# Patient Record
Sex: FEMALE | Race: WHITE | NOT HISPANIC OR LATINO | Employment: FULL TIME | URBAN - METROPOLITAN AREA
[De-identification: names, ages, dates, MRNs, and addresses within clinical notes are randomized per-mention and may not be internally consistent; named-entity substitution may affect disease eponyms.]

---

## 2023-04-18 PROBLEM — K21.9 GERD (GASTROESOPHAGEAL REFLUX DISEASE): Status: ACTIVE | Noted: 2023-04-18

## 2023-04-18 PROBLEM — F98.8 ADD (ATTENTION DEFICIT DISORDER): Status: ACTIVE | Noted: 2023-04-18

## 2023-04-18 PROBLEM — N91.2 AMENORRHEA: Status: ACTIVE | Noted: 2023-04-18

## 2023-04-18 PROBLEM — F33.9 RECURRENT MAJOR DEPRESSIVE DISORDER (CMS-HCC): Status: ACTIVE | Noted: 2023-04-18

## 2023-04-18 PROBLEM — N92.6 IRREGULAR MENSTRUAL CYCLE: Status: ACTIVE | Noted: 2023-04-18

## 2023-04-18 PROBLEM — N92.0 MENORRHAGIA: Status: ACTIVE | Noted: 2023-04-18

## 2023-04-18 PROBLEM — E28.2 PCOS (POLYCYSTIC OVARIAN SYNDROME): Status: ACTIVE | Noted: 2023-04-18

## 2023-04-18 PROBLEM — N92.6 MISSED MENSES: Status: ACTIVE | Noted: 2023-04-18

## 2023-04-18 PROBLEM — F41.9 ANXIETY: Status: ACTIVE | Noted: 2023-04-18

## 2023-04-18 PROBLEM — E66.01 OBESITY, MORBID, BMI 40.0-49.9 (MULTI): Status: ACTIVE | Noted: 2023-04-18

## 2023-04-18 PROBLEM — T75.3XXA MOTION SICKNESS: Status: ACTIVE | Noted: 2023-04-18

## 2023-04-28 ENCOUNTER — PATIENT MESSAGE (OUTPATIENT)
Dept: PRIMARY CARE | Facility: CLINIC | Age: 23
End: 2023-04-28
Payer: COMMERCIAL

## 2023-05-01 ENCOUNTER — OFFICE VISIT (OUTPATIENT)
Dept: PRIMARY CARE | Facility: CLINIC | Age: 23
End: 2023-05-01
Payer: COMMERCIAL

## 2023-05-01 VITALS
BODY MASS INDEX: 51.19 KG/M2 | WEIGHT: 289 LBS | SYSTOLIC BLOOD PRESSURE: 128 MMHG | OXYGEN SATURATION: 98 % | TEMPERATURE: 97.3 F | HEART RATE: 85 BPM | DIASTOLIC BLOOD PRESSURE: 80 MMHG

## 2023-05-01 DIAGNOSIS — R00.2 PALPITATION: Primary | ICD-10-CM

## 2023-05-01 DIAGNOSIS — N91.2 AMENORRHEA: ICD-10-CM

## 2023-05-01 LAB
POC APPEARANCE, URINE: CLEAR
POC BILIRUBIN, URINE: NEGATIVE
POC BLOOD, URINE: NEGATIVE
POC COLOR, URINE: YELLOW
POC GLUCOSE, URINE: NEGATIVE MG/DL
POC KETONES, URINE: NEGATIVE MG/DL
POC LEUKOCYTES, URINE: ABNORMAL
POC NITRITE,URINE: NEGATIVE
POC PH, URINE: 6.5 PH
POC PROTEIN, URINE: NEGATIVE MG/DL
POC SPECIFIC GRAVITY, URINE: 1.02
POC UROBILINOGEN, URINE: 0.2 EU/DL
PREGNANCY TEST URINE, POC: NEGATIVE

## 2023-05-01 PROCEDURE — 93000 ELECTROCARDIOGRAM COMPLETE: CPT | Performed by: FAMILY MEDICINE

## 2023-05-01 PROCEDURE — 81025 URINE PREGNANCY TEST: CPT | Performed by: FAMILY MEDICINE

## 2023-05-01 PROCEDURE — 99214 OFFICE O/P EST MOD 30 MIN: CPT | Performed by: FAMILY MEDICINE

## 2023-05-01 PROCEDURE — 81003 URINALYSIS AUTO W/O SCOPE: CPT | Performed by: FAMILY MEDICINE

## 2023-05-01 RX ORDER — FLUTICASONE PROPIONATE 50 MCG
2 SPRAY, SUSPENSION (ML) NASAL DAILY
COMMUNITY
Start: 2023-04-03

## 2023-05-01 RX ORDER — DEXTROAMPHETAMINE SACCHARATE, AMPHETAMINE ASPARTATE, DEXTROAMPHETAMINE SULFATE AND AMPHETAMINE SULFATE 2.5; 2.5; 2.5; 2.5 MG/1; MG/1; MG/1; MG/1
1 TABLET ORAL DAILY
COMMUNITY
Start: 2019-05-29 | End: 2023-05-01 | Stop reason: ALTCHOICE

## 2023-05-01 RX ORDER — CETIRIZINE HYDROCHLORIDE 10 MG/1
10 TABLET ORAL DAILY
COMMUNITY
Start: 2023-04-03

## 2023-05-01 RX ORDER — OMEPRAZOLE 20 MG/1
20 CAPSULE, DELAYED RELEASE ORAL
COMMUNITY
Start: 2023-04-03 | End: 2024-02-16

## 2023-05-01 ASSESSMENT — PATIENT HEALTH QUESTIONNAIRE - PHQ9
7. TROUBLE CONCENTRATING ON THINGS, SUCH AS READING THE NEWSPAPER OR WATCHING TELEVISION: NOT AT ALL
9. THOUGHTS THAT YOU WOULD BE BETTER OFF DEAD, OR OF HURTING YOURSELF: NOT AT ALL
10. IF YOU CHECKED OFF ANY PROBLEMS, HOW DIFFICULT HAVE THESE PROBLEMS MADE IT FOR YOU TO DO YOUR WORK, TAKE CARE OF THINGS AT HOME, OR GET ALONG WITH OTHER PEOPLE: NOT DIFFICULT AT ALL
4. FEELING TIRED OR HAVING LITTLE ENERGY: SEVERAL DAYS
8. MOVING OR SPEAKING SO SLOWLY THAT OTHER PEOPLE COULD HAVE NOTICED. OR THE OPPOSITE, BEING SO FIGETY OR RESTLESS THAT YOU HAVE BEEN MOVING AROUND A LOT MORE THAN USUAL: NOT AT ALL
1. LITTLE INTEREST OR PLEASURE IN DOING THINGS: NOT AT ALL
2. FEELING DOWN, DEPRESSED OR HOPELESS: SEVERAL DAYS
SUM OF ALL RESPONSES TO PHQ9 QUESTIONS 1 AND 2: 1
6. FEELING BAD ABOUT YOURSELF - OR THAT YOU ARE A FAILURE OR HAVE LET YOURSELF OR YOUR FAMILY DOWN: NOT AT ALL
3. TROUBLE FALLING OR STAYING ASLEEP OR SLEEPING TOO MUCH: MORE THAN HALF THE DAYS
5. POOR APPETITE OR OVEREATING: SEVERAL DAYS
SUM OF ALL RESPONSES TO PHQ QUESTIONS 1-9: 5

## 2023-05-01 ASSESSMENT — ANXIETY QUESTIONNAIRES
2. NOT BEING ABLE TO STOP OR CONTROL WORRYING: NOT AT ALL
4. TROUBLE RELAXING: NOT AT ALL
GAD7 TOTAL SCORE: 4
6. BECOMING EASILY ANNOYED OR IRRITABLE: MORE THAN HALF THE DAYS
7. FEELING AFRAID AS IF SOMETHING AWFUL MIGHT HAPPEN: SEVERAL DAYS
5. BEING SO RESTLESS THAT IT IS HARD TO SIT STILL: NOT AT ALL
1. FEELING NERVOUS, ANXIOUS, OR ON EDGE: SEVERAL DAYS
IF YOU CHECKED OFF ANY PROBLEMS ON THIS QUESTIONNAIRE, HOW DIFFICULT HAVE THESE PROBLEMS MADE IT FOR YOU TO DO YOUR WORK, TAKE CARE OF THINGS AT HOME, OR GET ALONG WITH OTHER PEOPLE: SOMEWHAT DIFFICULT
3. WORRYING TOO MUCH ABOUT DIFFERENT THINGS: NOT AT ALL

## 2023-05-01 NOTE — PROGRESS NOTES
Subjective   Patient ID: Samantha Sunshine is a 23 y.o. female who presents for Med Refill and Heart Flutters (X1 week, consistent, SOB, chest discomfort).  HPI    PALPITATIONS: started in high school   Off and on over the last few years    Started last Wednesday night and feeling like several time per hour skips a beat  Episodes last 5-15 minutes  Some pressure in  her chest and some dizziness  No SOB    Switched to keto type diet - strict protein and veggies 3 weeks  Not taking regular potassium and Elderberry     Menstrual cycles regular, not overlyheavy  Better on BCPs     Patient Health Questionnaire-9 Score: 5   RL-7 Total Score: 4      Review of Systems  Review of Systems negative except as noted in HPI and Chief complaint.     Objective     Patient Health Questionnaire-9 Score: 5     RL-7 Total Score: 4     Physical Exam  Constitutional:       General: She is not in acute distress.     Appearance: Normal appearance. She is not ill-appearing.   HENT:      Head: Normocephalic and atraumatic.   Neck:      Vascular: No carotid bruit.   Cardiovascular:      Rate and Rhythm: Normal rate.      Pulses: Normal pulses.      Heart sounds: Normal heart sounds. No murmur heard.     No gallop.      Comments: Skipped beat   Pulmonary:      Effort: Pulmonary effort is normal.      Breath sounds: Normal breath sounds. No wheezing, rhonchi or rales.   Musculoskeletal:      Cervical back: Normal range of motion and neck supple. No rigidity or tenderness.   Lymphadenopathy:      Cervical: No cervical adenopathy.   Skin:     General: Skin is warm and dry.   Neurological:      Mental Status: She is alert.   Psychiatric:         Mood and Affect: Mood normal.         Behavior: Behavior normal.       /80 (BP Location: Left arm, Patient Position: Sitting, BP Cuff Size: Adult)   Pulse 85   Temp 36.3 °C (97.3 °F)   Wt 131 kg (289 lb)   SpO2 98%   BMI 51.19 kg/m²      Assessment/Plan   Problem List Items Addressed This Visit           Genitourinary    Amenorrhea    Relevant Orders    POCT pregnancy, urine manually resulted (Completed)     Other Visit Diagnoses       Palpitation    -  Primary    Relevant Orders    Magnesium    POCT UA Automated manually resulted (Completed)    ECG 12 lead (Clinic Performed) (Completed)    Holter or Event Cardiac Monitor    CBC    Comprehensive Metabolic Panel    TSH with reflex to Free T4 if abnormal

## 2023-05-10 DIAGNOSIS — E28.2 POLYCYSTIC OVARIAN SYNDROME: ICD-10-CM

## 2023-05-12 DIAGNOSIS — E28.2 POLYCYSTIC OVARIAN SYNDROME: ICD-10-CM

## 2023-05-12 RX ORDER — DROSPIRENONE AND ETHINYL ESTRADIOL 0.02-3(28)
KIT ORAL
Qty: 28 TABLET | Refills: 2 | OUTPATIENT
Start: 2023-05-12

## 2023-05-12 RX ORDER — METFORMIN HYDROCHLORIDE 500 MG/1
TABLET ORAL
Qty: 60 TABLET | Refills: 0 | OUTPATIENT
Start: 2023-05-12 | End: 2023-06-11

## 2023-06-07 ENCOUNTER — APPOINTMENT (OUTPATIENT)
Dept: PRIMARY CARE | Facility: CLINIC | Age: 23
End: 2023-06-07
Payer: COMMERCIAL

## 2023-06-29 ENCOUNTER — OFFICE VISIT (OUTPATIENT)
Dept: PRIMARY CARE | Facility: CLINIC | Age: 23
End: 2023-06-29
Payer: COMMERCIAL

## 2023-06-29 VITALS
OXYGEN SATURATION: 97 % | HEIGHT: 63 IN | BODY MASS INDEX: 51.38 KG/M2 | HEART RATE: 89 BPM | DIASTOLIC BLOOD PRESSURE: 80 MMHG | SYSTOLIC BLOOD PRESSURE: 122 MMHG | WEIGHT: 290 LBS

## 2023-06-29 DIAGNOSIS — E66.01 CLASS 3 SEVERE OBESITY DUE TO EXCESS CALORIES WITHOUT SERIOUS COMORBIDITY WITH BODY MASS INDEX (BMI) OF 50.0 TO 59.9 IN ADULT (MULTI): ICD-10-CM

## 2023-06-29 DIAGNOSIS — I49.3 FREQUENT PVCS: ICD-10-CM

## 2023-06-29 DIAGNOSIS — F41.8 MIXED ANXIETY AND DEPRESSIVE DISORDER: ICD-10-CM

## 2023-06-29 DIAGNOSIS — E28.2 POLYCYSTIC OVARIAN SYNDROME: ICD-10-CM

## 2023-06-29 DIAGNOSIS — Z00.00 ANNUAL PHYSICAL EXAM: Primary | ICD-10-CM

## 2023-06-29 DIAGNOSIS — K21.9 GASTROESOPHAGEAL REFLUX DISEASE WITHOUT ESOPHAGITIS: ICD-10-CM

## 2023-06-29 DIAGNOSIS — F41.9 ANXIETY: ICD-10-CM

## 2023-06-29 DIAGNOSIS — Z01.419 ENCOUNTER FOR GYNECOLOGICAL EXAMINATION WITHOUT ABNORMAL FINDING: ICD-10-CM

## 2023-06-29 PROBLEM — E66.813 CLASS 3 SEVERE OBESITY DUE TO EXCESS CALORIES WITHOUT SERIOUS COMORBIDITY WITH BODY MASS INDEX (BMI) OF 50.0 TO 59.9 IN ADULT: Status: ACTIVE | Noted: 2023-04-18

## 2023-06-29 PROCEDURE — 1036F TOBACCO NON-USER: CPT | Performed by: FAMILY MEDICINE

## 2023-06-29 PROCEDURE — 99395 PREV VISIT EST AGE 18-39: CPT | Performed by: FAMILY MEDICINE

## 2023-06-29 PROCEDURE — 99214 OFFICE O/P EST MOD 30 MIN: CPT | Performed by: FAMILY MEDICINE

## 2023-06-29 PROCEDURE — 3008F BODY MASS INDEX DOCD: CPT | Performed by: FAMILY MEDICINE

## 2023-06-29 RX ORDER — METFORMIN HYDROCHLORIDE 500 MG/1
500 TABLET ORAL 2 TIMES DAILY
Qty: 180 TABLET | Refills: 1 | Status: SHIPPED | OUTPATIENT
Start: 2023-06-29 | End: 2023-10-19 | Stop reason: SDUPTHER

## 2023-06-29 RX ORDER — METOPROLOL SUCCINATE 25 MG/1
25 TABLET, EXTENDED RELEASE ORAL DAILY
Qty: 30 TABLET | Refills: 1 | Status: SHIPPED | OUTPATIENT
Start: 2023-06-29 | End: 2023-07-16

## 2023-06-29 RX ORDER — ESCITALOPRAM OXALATE 20 MG/1
40 TABLET ORAL DAILY
Qty: 60 TABLET | Refills: 2 | Status: SHIPPED | OUTPATIENT
Start: 2023-06-29 | End: 2023-06-29 | Stop reason: SDUPTHER

## 2023-06-29 RX ORDER — ESCITALOPRAM OXALATE 20 MG/1
40 TABLET ORAL DAILY
Qty: 180 TABLET | Refills: 1 | Status: SHIPPED | OUTPATIENT
Start: 2023-06-29 | End: 2023-10-19 | Stop reason: SDUPTHER

## 2023-06-29 ASSESSMENT — PATIENT HEALTH QUESTIONNAIRE - PHQ9
9. THOUGHTS THAT YOU WOULD BE BETTER OFF DEAD, OR OF HURTING YOURSELF: NOT AT ALL
4. FEELING TIRED OR HAVING LITTLE ENERGY: MORE THAN HALF THE DAYS
7. TROUBLE CONCENTRATING ON THINGS, SUCH AS READING THE NEWSPAPER OR WATCHING TELEVISION: NEARLY EVERY DAY
5. POOR APPETITE OR OVEREATING: MORE THAN HALF THE DAYS
SUM OF ALL RESPONSES TO PHQ9 QUESTIONS 1 AND 2: 2
1. LITTLE INTEREST OR PLEASURE IN DOING THINGS: SEVERAL DAYS
6. FEELING BAD ABOUT YOURSELF - OR THAT YOU ARE A FAILURE OR HAVE LET YOURSELF OR YOUR FAMILY DOWN: SEVERAL DAYS
8. MOVING OR SPEAKING SO SLOWLY THAT OTHER PEOPLE COULD HAVE NOTICED. OR THE OPPOSITE, BEING SO FIGETY OR RESTLESS THAT YOU HAVE BEEN MOVING AROUND A LOT MORE THAN USUAL: NOT AT ALL
2. FEELING DOWN, DEPRESSED OR HOPELESS: SEVERAL DAYS
10. IF YOU CHECKED OFF ANY PROBLEMS, HOW DIFFICULT HAVE THESE PROBLEMS MADE IT FOR YOU TO DO YOUR WORK, TAKE CARE OF THINGS AT HOME, OR GET ALONG WITH OTHER PEOPLE: SOMEWHAT DIFFICULT
SUM OF ALL RESPONSES TO PHQ QUESTIONS 1-9: 12
3. TROUBLE FALLING OR STAYING ASLEEP OR SLEEPING TOO MUCH: MORE THAN HALF THE DAYS

## 2023-06-29 ASSESSMENT — PROMIS GLOBAL HEALTH SCALE
EMOTIONAL_PROBLEMS: OFTEN
RATE_MENTAL_HEALTH: FAIR
CARRYOUT_PHYSICAL_ACTIVITIES: COMPLETELY
RATE_SOCIAL_SATISFACTION: VERY GOOD
RATE_PHYSICAL_HEALTH: GOOD
RATE_GENERAL_HEALTH: GOOD
RATE_AVERAGE_FATIGUE: MODERATE
RATE_QUALITY_OF_LIFE: GOOD
RATE_AVERAGE_PAIN: 0
CARRYOUT_SOCIAL_ACTIVITIES: VERY GOOD

## 2023-06-29 ASSESSMENT — ANXIETY QUESTIONNAIRES
5. BEING SO RESTLESS THAT IT IS HARD TO SIT STILL: NOT AT ALL
GAD7 TOTAL SCORE: 8
7. FEELING AFRAID AS IF SOMETHING AWFUL MIGHT HAPPEN: SEVERAL DAYS
2. NOT BEING ABLE TO STOP OR CONTROL WORRYING: SEVERAL DAYS
3. WORRYING TOO MUCH ABOUT DIFFERENT THINGS: SEVERAL DAYS
1. FEELING NERVOUS, ANXIOUS, OR ON EDGE: MORE THAN HALF THE DAYS
IF YOU CHECKED OFF ANY PROBLEMS ON THIS QUESTIONNAIRE, HOW DIFFICULT HAVE THESE PROBLEMS MADE IT FOR YOU TO DO YOUR WORK, TAKE CARE OF THINGS AT HOME, OR GET ALONG WITH OTHER PEOPLE: SOMEWHAT DIFFICULT
4. TROUBLE RELAXING: SEVERAL DAYS
6. BECOMING EASILY ANNOYED OR IRRITABLE: MORE THAN HALF THE DAYS

## 2023-06-29 NOTE — PROGRESS NOTES
"Subjective   Samantha Sunshine is a 23 y.o. female and is here for a comprehensive physical exam. The patient reports  the following concerns .  TACHYCARDIA: Had another episode that landed her in ED for racing heart  D dimer  elevated - CT normal    Review of recent Holter with multiple episodes of sVT  She has not made follow up appointment with Dr. Anaya, cardiology    Do you take any herbs or supplements that were not prescribed by a doctor? yes magnesium  Are you taking calcium supplements? no  Are you taking aspirin daily? 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  Tobacco Use: Low Risk  (2023)    Patient History     Smoking Tobacco Use: Never     Smokeless Tobacco Use: Never     Passive Exposure: Not on file     Alcohol Use: Not on file       DIET: regular, well rounded  1 caffeine drink per day    EXERCISE: occasional walking 1-2 times per week    ELIMINATION: no constipation or diarrhea  She does have frequent bowel movements 3-4 times per day  Has not tried metameucil for fiber supplementation    No urinary issues    SLEEP: no issues    MOODS: increased irritability easily annoyed  Waking up with anxious feeling   Tried Zoloft in the past - made her emotionless  More depressed   Not seeing counselor  PHQ-9: Patient Health Questionnaire-9 Score: 12    RL-7: RL-7 Total Score: 8     OB/GYN:   No known family history of breast cancer.  Menstrual cycles - still mildly irregular, stopped BCPs early May - had one cycle since      History:  LMP: No LMP recorded.  Last pap date: not performed yet  : 0  Para: 0    Review of Systems   Review of Systems negative except as noted in HPI and Chief complaint.     Objective     /80 (BP Location: Left arm, Patient Position: Sitting, BP Cuff Size: Adult)   Pulse 89   Ht 1.6 m (5' 3\")   Wt 132 kg (290 lb)   SpO2 97%   BMI 51.37 kg/m²      Physical Exam  Constitutional:       General: She is not in acute distress.     " Appearance: Normal appearance.   HENT:      Head: Normocephalic and atraumatic.      Right Ear: Tympanic membrane, ear canal and external ear normal.      Left Ear: Tympanic membrane, ear canal and external ear normal.      Nose: Nose normal.      Mouth/Throat:      Mouth: Mucous membranes are moist.      Pharynx: Oropharynx is clear.   Eyes:      Extraocular Movements: Extraocular movements intact.      Conjunctiva/sclera: Conjunctivae normal.      Pupils: Pupils are equal, round, and reactive to light.   Neck:      Vascular: No carotid bruit.   Cardiovascular:      Rate and Rhythm: Normal rate and regular rhythm.      Pulses: Normal pulses.      Heart sounds: Normal heart sounds. No murmur heard.  Pulmonary:      Effort: Pulmonary effort is normal.      Breath sounds: Normal breath sounds. No wheezing, rhonchi or rales.   Abdominal:      General: Abdomen is flat. Bowel sounds are normal. There is no distension.      Palpations: Abdomen is soft.      Tenderness: There is no abdominal tenderness. There is no guarding or rebound.   Musculoskeletal:         General: No swelling. Normal range of motion.      Cervical back: No rigidity or tenderness.   Lymphadenopathy:      Cervical: No cervical adenopathy.   Skin:     General: Skin is warm and dry.      Findings: No rash.   Neurological:      General: No focal deficit present.      Mental Status: She is alert and oriented to person, place, and time.      Cranial Nerves: No cranial nerve deficit.      Coordination: Coordination normal.      Gait: Gait normal.   Psychiatric:         Mood and Affect: Mood normal.         Behavior: Behavior normal.       Assessment/Plan   Healthy female exam.      1. Please have labs drawn at your earliest convenience.  You should fast 12 hours prior to arriving at the lab - during these 12 hours you may have water, black coffee, black tea - nothing with cream or sugar and no solid foods.    Our office will contact you with results after  they have been reviewed.  Please allow 48 - 72 hours for most results, some may take longer.  Please keep in mind that results may post immediately to your online portal, even before we have a chance to review them.  Once we have had the opportunity to review we will post comments and have our staff contact you with results and any instructions.  Please call our office if you do not hear from our office in 7 days.     2. Patient Counseling:  --Nutrition: Stressed importance of moderation in sodium/caffeine intake, saturated fat and cholesterol, caloric balance, sufficient intake of fresh fruits, vegetables, fiber, calcium, iron, and 1 mg of folate supplement per day (for females capable of pregnancy).  --Discussed the issue of estrogen replacement, calcium supplement, and the daily use of baby aspirin.  --Exercise: Stressed the importance of regular exercise.   --Substance Abuse: Discussed cessation/primary prevention of tobacco, alcohol, or other drug use; driving or other dangerous activities under the influence; availability of treatment for abuse.    --Sexuality: Discussed sexually transmitted diseases, partner selection, use of condoms, avoidance of unintended pregnancy  and contraceptive alternatives.   --Injury prevention: Discussed safety belts, safety helmets, smoke detector, smoking near bedding or upholstery.   --Dental health: Discussed importance of regular tooth brushing, flossing, and dental visits.  --Immunizations reviewed.  --Discussed benefits of screening colonoscopy.  --After hours service discussed with patient  3. Discussed the patient's BMI with her.  The BMI is above average. The patient received Provided instructions on dietary changes  Provided instructions on exercise  Advised to Increase physical activity because they have an above normal BMI.    Follow up next physical in 1 year    Problem List Items Addressed This Visit       Anxiety     I have discussed the collaborative care model  for this patient's behavioral health care.  Written detailed information and identifying the members of this care team was provided to patient.  They give permission for the Behavioral Health Manager (BHM) and psychiatric consultant to be included in their care with my continued primary management.  Patient made aware that services provided as part of the Collaborative Care Model are subject to insurance billing.          Relevant Orders    Follow Up In Advanced Primary Care - Behavioral Health [facility]    GERD (gastroesophageal reflux disease)    Class 3 severe obesity due to excess calories without serious comorbidity with body mass index (BMI) of 50.0 to 59.9 in adult (CMS/[facility])    Annual physical exam - Primary    Mixed anxiety and depressive disorder    Relevant Medications    escitalopram (Lexapro) 20 mg tablet    Other Relevant Orders    Follow Up In Advanced Primary Care - Behavioral Health [facility]    Frequent PVCs    Relevant Medications    metoprolol succinate XL (Toprol-XL) 25 mg 24 hr tablet    Other Relevant Orders    Home sleep apnea test (HSAT)     Other Visit Diagnoses       Encounter for gynecological examination without abnormal finding        Relevant Orders    Referral to Obstetrics / Gynecology    Polycystic ovarian syndrome        Relevant Medications    metFORMIN (Glucophage) 500 mg tablet        The patient received Provided instructions on dietary changes  Provided instructions on exercise  Advised to Increase physical activity because they have an above normal BMI.   Follow up with cardiology recommended.    Follow up 4-6 weeks for recheck.

## 2023-06-30 NOTE — ASSESSMENT & PLAN NOTE
I have discussed the collaborative care model for this patient's behavioral health care.  Written detailed information and identifying the members of this care team was provided to patient.  They give permission for the Behavioral Health Manager (BHM) and psychiatric consultant to be included in their care with my continued primary management.  Patient made aware that services provided as part of the Collaborative Care Model are subject to insurance billing.

## 2023-07-11 ENCOUNTER — SOCIAL WORK (OUTPATIENT)
Dept: PRIMARY CARE | Facility: CLINIC | Age: 23
End: 2023-07-11
Payer: COMMERCIAL

## 2023-07-11 DIAGNOSIS — F41.8 MIXED ANXIETY AND DEPRESSIVE DISORDER: ICD-10-CM

## 2023-07-11 DIAGNOSIS — F41.9 ANXIETY: ICD-10-CM

## 2023-07-11 ASSESSMENT — ANXIETY QUESTIONNAIRES
6. BECOMING EASILY ANNOYED OR IRRITABLE: SEVERAL DAYS
5. BEING SO RESTLESS THAT IT IS HARD TO SIT STILL: NOT AT ALL
GAD7 TOTAL SCORE: 5
3. WORRYING TOO MUCH ABOUT DIFFERENT THINGS: MORE THAN HALF THE DAYS
IF YOU CHECKED OFF ANY PROBLEMS ON THIS QUESTIONNAIRE, HOW DIFFICULT HAVE THESE PROBLEMS MADE IT FOR YOU TO DO YOUR WORK, TAKE CARE OF THINGS AT HOME, OR GET ALONG WITH OTHER PEOPLE: NOT DIFFICULT AT ALL
4. TROUBLE RELAXING: NOT AT ALL
1. FEELING NERVOUS, ANXIOUS, OR ON EDGE: SEVERAL DAYS
7. FEELING AFRAID AS IF SOMETHING AWFUL MIGHT HAPPEN: SEVERAL DAYS
2. NOT BEING ABLE TO STOP OR CONTROL WORRYING: NOT AT ALL

## 2023-07-11 ASSESSMENT — PATIENT HEALTH QUESTIONNAIRE - PHQ9
5. POOR APPETITE OR OVEREATING: MORE THAN HALF THE DAYS
SUM OF ALL RESPONSES TO PHQ QUESTIONS 1-9: 5
2. FEELING DOWN, DEPRESSED OR HOPELESS: SEVERAL DAYS
10. IF YOU CHECKED OFF ANY PROBLEMS, HOW DIFFICULT HAVE THESE PROBLEMS MADE IT FOR YOU TO DO YOUR WORK, TAKE CARE OF THINGS AT HOME, OR GET ALONG WITH OTHER PEOPLE: NOT DIFFICULT AT ALL
9. THOUGHTS THAT YOU WOULD BE BETTER OFF DEAD, OR OF HURTING YOURSELF: NOT AT ALL
8. MOVING OR SPEAKING SO SLOWLY THAT OTHER PEOPLE COULD HAVE NOTICED. OR THE OPPOSITE, BEING SO FIGETY OR RESTLESS THAT YOU HAVE BEEN MOVING AROUND A LOT MORE THAN USUAL: NOT AT ALL
4. FEELING TIRED OR HAVING LITTLE ENERGY: NOT AT ALL
6. FEELING BAD ABOUT YOURSELF - OR THAT YOU ARE A FAILURE OR HAVE LET YOURSELF OR YOUR FAMILY DOWN: NOT AT ALL
7. TROUBLE CONCENTRATING ON THINGS, SUCH AS READING THE NEWSPAPER OR WATCHING TELEVISION: SEVERAL DAYS
1. LITTLE INTEREST OR PLEASURE IN DOING THINGS: NOT AT ALL
SUM OF ALL RESPONSES TO PHQ9 QUESTIONS 1 & 2: 1
3. TROUBLE FALLING OR STAYING ASLEEP: SEVERAL DAYS

## 2023-07-11 NOTE — PROGRESS NOTES
"Collaborative Care (CoCM) Initial Assessment    Session Time  Start: 5:02 PM  End: 6:02 PM     Collaborative Care program information (including case discussion with psychiatry, involvement of [facility] and billing when applicable) was provided and discussed with the patient. Patient Indicated understanding and agreed to proceed.   Confirm: Yes    Patient Health Questionnaire-9 Score: 5 (7/11/2023  6:00 PM)  RL-7 Total Score: 5 (7/11/2023  6:00 PM)        Reason for Visit / Chief Complaint  Chief Complaint   Patient presents with    Initial Assessment     Accompanied by: Self  Guardian Status: Self  Caregiver Status: n/a    Review of Symptoms    Sleep   Average Hours Sleep in/Night:  better than it used to be- fall asleep between 9:30-12, wake at 6:45-7:45  Sleep Symptoms:  sometimes will wake in middle of night in spurts- random and sporatic, no trouble falling asleep  \"If I could just sleep, I would- I love to sleep\"      Mood   Symptom Onset/Duration:  depression came after anxiety-- trigger- first day of band camp, freshman in - saw someone pass out and have a seizure and it spiraled from there; 1 panic attack before 16, and after that a feeling of drowning  Current Sx:   feel really good recently; comes in waves- tired, withdrawing (self seclude), overeat, make plans and then cancel right before- low periods last   If I catch it, it could be a couple days, if I try to hide it, it can last months; also has moments and snaps out of it, last major depressive episode jan 2022- living at school, living by self in a dorm, easy to seclude self and wasn't doing well in school-- left school  Triggers: did something I feel was wrong, parents upset with her, other people's moods and how they react to me or being secluded for too long      Anxiety   Symptom Onset/Duration: high school  Current Sx: feeling nervous/anxious/on edge, worrying too much, easily annoyed/irritable, trouble concentrating, afraid something awful may " "happen, negative thought of self, racing thoughts, avoidance, and unhelpful thinking patterns  Panic / Somatic Sx:  occassionally, but able to control before it happens- jittery, feels like can't breath, drowning, dizzy, then thoughts come in that something  bad will happen to her  Triggers: self aware of body- if something feels slightly off or feeling dizzy, moments of spacing out will trigger- feel something is wrong with me and anxiety spikes.  Too self aware; crowds don't cause anxiety    Major stressors that have caused anxiety and depressive sx: finances, going back to school, getting sick and missing work    Self-Esteem / Self-Image   Self Esteem Rating (1-10 Scale, 10 being high): 2- moments of feeling good feeling \"bad ass\", mostly just throwing self together for work.  Low but don't realize it  Self-Esteem / Self Image Sx: able to identify strength, sensitive to criticism, struggles with confidence, feels has good qualities, respects self, feels inferior to others, seeks out validation from others, feels not noticed, compares self to others, and judges self    Appetite   Description of Overall Appetite: could eat healthier and has tried- feels amazing when she does-- when doing it by self with others not eating similarly, she gives in and eats what they eat  Eating Behaviors: binge eats/purge- in college. Hasn't felt urge to purge since college  Concerns with appetite: none, denied    Anger / Irritability  Symptoms of Anger / Irritability:   irritable- some days just drives her crazy - days at a time when irritable, but most of the time not- never know when it will happen    Communication / Self Expression  Communication Style & Concerns:  people pleaser until something needs to be said, then will say bluntly but nicely. If passive aggressive or aggressive, it's out of a random extreme anger-not often though    Trauma    Endorses trauma- freshman year of HS in band- mom overheated and got sick EMS came " "and took to hospital-- didn't feel supported during this- whenever talking about it gets emotional; struggled to return to the place where it happened-dreading it the whole year; taken a long time to realize it was traumatic  No hx abuse    Grief / Loss / Adjustment   \"There's a lot\"  Uncle jan 2021, great aunt, grandfather, aunt, aunt-- everything happens at once; still grieving the losses- won't ever stop    Hallucinations / Delusions   Hallucinations & Delusions Experienced: none, denied    Learning Concerns / Memory   Learning Concerns & Sx: comprehension- takes a longer time to understand something reading or talking  Memory Concerns & Sx: none, denied    Functional impairment   Impacting ADL's: no impairment   Impacting IADL's: No impairment  Impacting Ability : No impairment    Associated Medical Concerns   Potential Associated Factors: PCOS, irregular menstrual cycle      Comprehensive Behavioral Health History     Medications  Current Mental Health Medications:   Lexapro 40mg- just increased; no side effects noted    Past Mental Health Medications:   Has been on zoloft and ADHD meds- tried a lot of meds  Zoloft- made feel like going through motions- no motivation/drive  ADHD meds- adderall made feel like zombie; switched to XR- when it kicked in, flat affect, focalin and ritalin- made feel like ants jumping out of skin, stratera- don't remember    Concerns / challenges / barriers with taking medications? No concerns    Open to medication recommendations from consulting psychiatrist? Yes    Do you ever forget to take your medication? Yes often forgets medication- will do well for a month, then forget 3-4 days in a row      Mental Health Treatment History  Mental Health Treatment:  therapy in past- \"it was fine\"- didn't get much feedback, just breathing technique.  This was a few years ago- [facility] virtual- [facility]    Risk History  Suicidal Thoughts/Method/Intent/Plan:  long time ago- in " HS  Suicide Attempts/Preparations:  no thoughts- too scared  Number of Suicide Attempts: 0  Access to Firearms/Lethal Means: No guns in home no firearms  Non-Suicidal Self Injury: None, denied   Last Yuma Risk Score:    Protective Factors: strong protective factors and generative employment    Violence: None, denied  Homicidal Thoughts/Method/Plan/Intent: None, denied  Homicidal Attempts/Preparations: None, denied  Number of Attempts: 0      Substance Use History    Substances    Social History     Substance and Sexual Activity   Alcohol Use Not Currently     Social History     Substance and Sexual Activity   Drug Use Never       Substance Current Use   Tobacco/Nicotine, Alcohol, Marijuana, and Synthetic marijuana- Delta 8  Alcohol occassionally - have some drinks 1x/month                   Addiction Treatment     Types of Addiction Treatment: n/a  Currently Sober? N/a     Status/Length of Sobriety: n/a    Family History    Mental Health / Conditions    Family Member Condition / Diagnosis Medications / Side Effects   Mom-anxiety     Dad- ADD     Sister-anxiety, ADD     Brother-anxiety       Substance Use    Family Member Substance Current Use   Mom's side of family- smoked- pills, cocaine and drank  Dad's brother- alcoholic                        History of Suicide    Family Member Details   uncle- mom's brother- attempted prior to passing Hx of attempt           Social History    Housing   Living Situation:  mom, dad, sister, dogs, cats  Safe Housing Conditions / Feels Safe in Home: Yes    Employment  Current Employment: employed and full-time [employer]  Current Concerns/Challenges: No    Income   Current Concerns/Challenges: No  Receive Benefits/Assistance: No    Education   Status / Level of Education:  Accepted to career launch program- trying to go back in fall-[employer]  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 No  Are you a ? No    Sexuality / Gender   Concerns with Sexuality/Gender: None, denied  Sexual Orientation: heterosexual    Preferred Gender Pronouns / Identity: She/her/hers    Transportation   Transportation Concerns: None, denied    Pentecostalism/ Spirituality   Are you Catholic or Spiritual:  Gnosticist- believes but not actively practicing    Coping / Strengths / Supports   Coping:  breathing exercises - does not have a go-to hobby; Netflix [employer]  Strengths: good talking to people 'customer service', good at giving advice but not taking own, can talk people down; communication  Supports: Family      Abuse History  Physical Abuse: No  Sexual Abuse: No  Verbal / Emotional Abuse / Bullying (+Cyber): No   Financial Abuse: No  Domestic Violence: No    Assessment Summary  / Plan    Assessment Summary:  What do you want to work on/get out of collaborative care? \"Ah-ha\" feeling- always been hid self/emotions and be therapist to friends. Want to be more comfortable communicating about myself and not feel ashamed; understanding what is going on; motivation to do things instead of thinking about it    Plan:   Psych consult - ongoing, set meeting frequency, bi-weekly, Sfmzwpu-Vmomvikl-Luyatviz interventions, provide psycho-education, provide appropriate tx referrals, and provide appropriate resources    No follow-ups on file.    Provisional Findings / Impressions  Primary: F41.8 Mixed Anxiety and Depressive Disorder    Goals  Gain self confidence to be able to express self and gain more understanding of depression and anxiety and sx.    Care Plan    There is no care plan documentation to display.       "

## 2023-07-12 ENCOUNTER — DOCUMENTATION (OUTPATIENT)
Dept: BEHAVIORAL HEALTH | Facility: CLINIC | Age: 23
End: 2023-07-12
Payer: COMMERCIAL

## 2023-07-12 NOTE — PROGRESS NOTES
"[facility] Psychiatry Consult Note     Samantha Sunshine is a 23 y.o., referred to Collaborative Care for symptoms of anxiety and depression. I have reviewed the patient with the behavioral health manager and reviewed the patient's electronic record. Pertinent highlights of discussion and chart review include the following:   Patient reported psychiatric history of anxiety / depression / ADHD; recalled onset of anxiety in 9th grade while at band camp when she saw peers pass out / have a seizure, and subsequently developed fears of having similar problems. Anxiety led to depression and poor performance in college (dropped out).   Patient stated that she is usually able to identify when she starts going into a depressive period, and can sometimes catch herself early and get out of it - otherwise will be in that place for months.   Current stressor: has been getting sick a lot and has missed a lot of work (employed as [employer]); will be going back to school in August to pursue nursing degree.   Sleep: no concerns with sleep initiation or maintenance; reported that she loves to sleep and would sleep all the time if able   Substances: Prior use of marijuana and delta-8; none recently. Intermittent EtOH   Family history of anxiety, ADHD, substance abuse in multiple family members   Wishes: open to med recommendations, but won't take Zoloft due to prior intolerance    Psychotropic medications:   Current: escitalopram 40mg (20mg x2) daily - recently increased dose from 30mg (6/29/23), tolerating well thus far with uncertain clinical response   Prior sertraline: poor tolerance due to feeling like she was \"going through the motions\"; had no motivation or drive   Prior Adderall: poor tolerance due to feeling like a \"zombie\"; XR formulation caused flat affect   Prior Focalin and Ritalin: poor tolerance due to feeling like she had \"ants jumping out of her skin\"  Possible prior atomoxetine (no recollection of response)     Patient Health " Questionnaire-9 Score: 5 (7/11/2023  6:00 PM)  RL-7 Total Score: 5 (7/11/2023  6:00 PM)     Additional data:   Recent labs: CBC / BMP not pertinent 5/4/23; TSH not pertinent 5/3/23; HgbA1c 5.5 10/13/22    ECG 5/10/23 reported as normal; QTcB 446   Pertinent PMH: irregular menses & menorrhagia / GERD / PCOS / severe obesity     Recommendations:   Behavioral health manager (BHM) to continue to engage with patient   Agree with continuation of escitalopram 40mg at this time; if unable to tolerate or with incomplete clinical response after ~6-8 weeks at this dose, would recommend transition to alternative medication.    Recommend BHM use motivational interviewing to facilitate healthy lifestyle choices (e.g. adherence to medications, consistent engagement with mental health services), clarify ADHD symptoms (current and historical) as well as primary symptom targets for pharmacotherapy (to aid in selection of potential future agent), and evaluate for premenstrual symptom variation, as discussed in case review.     The above treatment considerations and suggestions are based on consultations with the patient's care manager and a review of information available in the electronic medical record. I have not personally examined the patient. All recommendations should be implemented with consideration of the patient's relevant prior history and current clinical status. Please feel free to call me with any questions about the care of this patient. I can easily be reached through [employer], or via email ([email or web address]).

## 2023-07-12 NOTE — Clinical Note
KEN regarding my recommendations based on our team's discussion in collaborative care; in brief I agree with continuing the escitalopram for now - but if she doesn't achieve complete / adequate symptom control after ~6-8 weeks would suggest transitioning her to something else. I'm going to have Josefina clarify her symptomatology in the meantime (including ADHD issues) to aid in selecting potential future meds.   Please feel free to reach out with any questions / comments / feedback / concerns.   Sixto, Bill

## 2023-07-13 DIAGNOSIS — I49.3 FREQUENT PVCS: ICD-10-CM

## 2023-07-16 RX ORDER — METOPROLOL SUCCINATE 25 MG/1
TABLET, EXTENDED RELEASE ORAL
Qty: 90 TABLET | Refills: 1 | Status: SHIPPED | OUTPATIENT
Start: 2023-07-16 | End: 2023-08-22 | Stop reason: SDUPTHER

## 2023-07-20 DIAGNOSIS — N92.1 MENORRHAGIA WITH IRREGULAR CYCLE: Primary | ICD-10-CM

## 2023-07-21 RX ORDER — NORETHINDRONE ACETATE AND ETHINYL ESTRADIOL 1MG-20(21)
1 KIT ORAL DAILY
Qty: 84 TABLET | Refills: 0 | Status: SHIPPED | OUTPATIENT
Start: 2023-07-21 | End: 2023-10-19 | Stop reason: SDUPTHER

## 2023-07-31 ENCOUNTER — DOCUMENTATION (OUTPATIENT)
Dept: PRIMARY CARE | Facility: CLINIC | Age: 23
End: 2023-07-31
Payer: COMMERCIAL

## 2023-07-31 DIAGNOSIS — F41.8 MIXED ANXIETY AND DEPRESSIVE DISORDER: Primary | ICD-10-CM

## 2023-07-31 PROCEDURE — 99492 1ST PSYC COLLAB CARE MGMT: CPT | Performed by: FAMILY MEDICINE

## 2023-08-03 ENCOUNTER — APPOINTMENT (OUTPATIENT)
Dept: PRIMARY CARE | Facility: CLINIC | Age: 23
End: 2023-08-03
Payer: COMMERCIAL

## 2023-08-15 ENCOUNTER — SOCIAL WORK (OUTPATIENT)
Dept: PRIMARY CARE | Facility: CLINIC | Age: 23
End: 2023-08-15
Payer: COMMERCIAL

## 2023-08-15 DIAGNOSIS — F41.8 MIXED ANXIETY AND DEPRESSIVE DISORDER: Primary | ICD-10-CM

## 2023-08-15 ASSESSMENT — PATIENT HEALTH QUESTIONNAIRE - PHQ9
5. POOR APPETITE OR OVEREATING: SEVERAL DAYS
10. IF YOU CHECKED OFF ANY PROBLEMS, HOW DIFFICULT HAVE THESE PROBLEMS MADE IT FOR YOU TO DO YOUR WORK, TAKE CARE OF THINGS AT HOME, OR GET ALONG WITH OTHER PEOPLE: NOT DIFFICULT AT ALL
4. FEELING TIRED OR HAVING LITTLE ENERGY: NOT AT ALL
3. TROUBLE FALLING OR STAYING ASLEEP: SEVERAL DAYS
SUM OF ALL RESPONSES TO PHQ QUESTIONS 1-9: 5
7. TROUBLE CONCENTRATING ON THINGS, SUCH AS READING THE NEWSPAPER OR WATCHING TELEVISION: NOT AT ALL
1. LITTLE INTEREST OR PLEASURE IN DOING THINGS: SEVERAL DAYS
2. FEELING DOWN, DEPRESSED OR HOPELESS: SEVERAL DAYS
8. MOVING OR SPEAKING SO SLOWLY THAT OTHER PEOPLE COULD HAVE NOTICED. OR THE OPPOSITE, BEING SO FIGETY OR RESTLESS THAT YOU HAVE BEEN MOVING AROUND A LOT MORE THAN USUAL: NOT AT ALL
9. THOUGHTS THAT YOU WOULD BE BETTER OFF DEAD, OR OF HURTING YOURSELF: NOT AT ALL
6. FEELING BAD ABOUT YOURSELF - OR THAT YOU ARE A FAILURE OR HAVE LET YOURSELF OR YOUR FAMILY DOWN: SEVERAL DAYS
SUM OF ALL RESPONSES TO PHQ9 QUESTIONS 1 & 2: 2

## 2023-08-15 ASSESSMENT — ANXIETY QUESTIONNAIRES
2. NOT BEING ABLE TO STOP OR CONTROL WORRYING: NOT AT ALL
6. BECOMING EASILY ANNOYED OR IRRITABLE: NOT AT ALL
1. FEELING NERVOUS, ANXIOUS, OR ON EDGE: NOT AT ALL
7. FEELING AFRAID AS IF SOMETHING AWFUL MIGHT HAPPEN: SEVERAL DAYS
4. TROUBLE RELAXING: NOT AT ALL
5. BEING SO RESTLESS THAT IT IS HARD TO SIT STILL: NOT AT ALL
GAD7 TOTAL SCORE: 2
3. WORRYING TOO MUCH ABOUT DIFFERENT THINGS: SEVERAL DAYS
IF YOU CHECKED OFF ANY PROBLEMS ON THIS QUESTIONNAIRE, HOW DIFFICULT HAVE THESE PROBLEMS MADE IT FOR YOU TO DO YOUR WORK, TAKE CARE OF THINGS AT HOME, OR GET ALONG WITH OTHER PEOPLE: NOT DIFFICULT AT ALL

## 2023-08-15 NOTE — PROGRESS NOTES
"Collaborative Care (CoCM)  Progress Note    Type of Interaction: In Office    Start Time: 5:03 PM    End Time: 5:52 PM    Appointment: Scheduled    Reason for Visit:   Chief Complaint   Patient presents with    Follow-up      Interval History / Patient Symptoms:     Patient Health Questionnaire-9 Score: 5 (8/15/2023  5:00 AM)  RL-7 Total Score: 2 (8/15/2023  3:00 PM)    Interventions Provided: Treatment Planning      Progress Made: N/A    Response to Intervention: Reviewed consulting psychiatrist recommendations; Reports that she has been taking 40mg escitalopram for a few weeks with no side effects.  Will continue on medication regimen.    Discussed treatment planning and what patient would like to focus on first; patient would like to work on self esteem and self worth- \"Who Am I?\".  Went over patient's current functioning, and reviewed an attention training exercise called mundane task focusing to start to exercise her attention muscles.    Patient would like to follow up with ADHD, and was given the BAARS to complete prior to next appointment.  Pt is aware that anxiety/depression will be main focus prior to addressing possible ADHD sx.        Plan:     Practice mundane task focusing technique  Complete BAARS and bring to next appointment      Follow Up / Next Appointment: Next appointment: 08/29/23      "

## 2023-08-22 ENCOUNTER — OFFICE VISIT (OUTPATIENT)
Dept: PRIMARY CARE | Facility: CLINIC | Age: 23
End: 2023-08-22
Payer: COMMERCIAL

## 2023-08-22 VITALS — HEART RATE: 78 BPM | SYSTOLIC BLOOD PRESSURE: 124 MMHG | OXYGEN SATURATION: 99 % | DIASTOLIC BLOOD PRESSURE: 80 MMHG

## 2023-08-22 DIAGNOSIS — I49.3 FREQUENT PVCS: ICD-10-CM

## 2023-08-22 PROCEDURE — 1036F TOBACCO NON-USER: CPT | Performed by: FAMILY MEDICINE

## 2023-08-22 PROCEDURE — 3008F BODY MASS INDEX DOCD: CPT | Performed by: FAMILY MEDICINE

## 2023-08-22 PROCEDURE — 99213 OFFICE O/P EST LOW 20 MIN: CPT | Performed by: FAMILY MEDICINE

## 2023-08-22 RX ORDER — METOPROLOL SUCCINATE 50 MG/1
50 TABLET, EXTENDED RELEASE ORAL DAILY
Qty: 30 TABLET | Refills: 2 | Status: SHIPPED | OUTPATIENT
Start: 2023-08-22 | End: 2023-11-24

## 2023-08-22 NOTE — PROGRESS NOTES
Subjective   Patient ID: Samantha Sunshine is a 23 y.o. female who presents for Follow-up (Heart flutters, has been on and off, getting a little better. ).  HPI    PALPITATIONS: persistent palpitations for the last several months  Had Holter 5/2023 - PVC burden 3.83%  PSVCs burden 9.33%    Episode of fluttering last week and lasted several days  She was feeling lightheaded and fatigued as well    Was started on Metoprolol Succinate 25 mg    Review of Systems    Review of Systems negative except as noted in HPI and Chief complaint.     Objective               Physical Exam  Constitutional:       General: She is not in acute distress.     Appearance: Normal appearance. She is not ill-appearing.   HENT:      Head: Normocephalic and atraumatic.   Neck:      Vascular: No carotid bruit.   Cardiovascular:      Rate and Rhythm: Normal rate and regular rhythm.      Pulses: Normal pulses.      Heart sounds: Normal heart sounds. No murmur heard.     No gallop.   Pulmonary:      Effort: Pulmonary effort is normal.      Breath sounds: Normal breath sounds. No wheezing, rhonchi or rales.   Musculoskeletal:      Cervical back: Normal range of motion and neck supple. No rigidity or tenderness.   Lymphadenopathy:      Cervical: No cervical adenopathy.   Skin:     General: Skin is warm and dry.   Neurological:      Mental Status: She is alert.   Psychiatric:         Mood and Affect: Mood normal.         Behavior: Behavior normal.       /80 (BP Location: Left arm, Patient Position: Sitting, BP Cuff Size: Adult)   Pulse 78   SpO2 99%      Assessment/Plan   Problem List Items Addressed This Visit       Frequent PVCs    Relevant Medications    metoprolol succinate XL (Toprol-XL) 50 mg 24 hr tablet   Follow up 1 month - consider recheck with cardiology if not improving.

## 2023-08-29 ENCOUNTER — SOCIAL WORK (OUTPATIENT)
Dept: PRIMARY CARE | Facility: CLINIC | Age: 23
End: 2023-08-29
Payer: COMMERCIAL

## 2023-08-29 DIAGNOSIS — F41.8 MIXED ANXIETY AND DEPRESSIVE DISORDER: Primary | ICD-10-CM

## 2023-08-29 ASSESSMENT — ANXIETY QUESTIONNAIRES
7. FEELING AFRAID AS IF SOMETHING AWFUL MIGHT HAPPEN: NOT AT ALL
GAD7 TOTAL SCORE: 2
1. FEELING NERVOUS, ANXIOUS, OR ON EDGE: NOT AT ALL
4. TROUBLE RELAXING: SEVERAL DAYS
2. NOT BEING ABLE TO STOP OR CONTROL WORRYING: NOT AT ALL
6. BECOMING EASILY ANNOYED OR IRRITABLE: NOT AT ALL
5. BEING SO RESTLESS THAT IT IS HARD TO SIT STILL: SEVERAL DAYS
IF YOU CHECKED OFF ANY PROBLEMS ON THIS QUESTIONNAIRE, HOW DIFFICULT HAVE THESE PROBLEMS MADE IT FOR YOU TO DO YOUR WORK, TAKE CARE OF THINGS AT HOME, OR GET ALONG WITH OTHER PEOPLE: NOT DIFFICULT AT ALL
3. WORRYING TOO MUCH ABOUT DIFFERENT THINGS: NOT AT ALL

## 2023-08-29 ASSESSMENT — PATIENT HEALTH QUESTIONNAIRE - PHQ9
7. TROUBLE CONCENTRATING ON THINGS, SUCH AS READING THE NEWSPAPER OR WATCHING TELEVISION: NOT AT ALL
1. LITTLE INTEREST OR PLEASURE IN DOING THINGS: SEVERAL DAYS
2. FEELING DOWN, DEPRESSED OR HOPELESS: NOT AT ALL
8. MOVING OR SPEAKING SO SLOWLY THAT OTHER PEOPLE COULD HAVE NOTICED. OR THE OPPOSITE, BEING SO FIGETY OR RESTLESS THAT YOU HAVE BEEN MOVING AROUND A LOT MORE THAN USUAL: NOT AT ALL
10. IF YOU CHECKED OFF ANY PROBLEMS, HOW DIFFICULT HAVE THESE PROBLEMS MADE IT FOR YOU TO DO YOUR WORK, TAKE CARE OF THINGS AT HOME, OR GET ALONG WITH OTHER PEOPLE: NOT DIFFICULT AT ALL
SUM OF ALL RESPONSES TO PHQ QUESTIONS 1-9: 5
5. POOR APPETITE OR OVEREATING: MORE THAN HALF THE DAYS
SUM OF ALL RESPONSES TO PHQ9 QUESTIONS 1 & 2: 1
4. FEELING TIRED OR HAVING LITTLE ENERGY: SEVERAL DAYS
6. FEELING BAD ABOUT YOURSELF - OR THAT YOU ARE A FAILURE OR HAVE LET YOURSELF OR YOUR FAMILY DOWN: NOT AT ALL
3. TROUBLE FALLING OR STAYING ASLEEP: SEVERAL DAYS
9. THOUGHTS THAT YOU WOULD BE BETTER OFF DEAD, OR OF HURTING YOURSELF: NOT AT ALL

## 2023-08-29 NOTE — PROGRESS NOTES
Collaborative Care (CoCM)  Progress Note    Type of Interaction: In Office    Start Time: 5:05 PM    End Time: 6:05 PM        Appointment: Scheduled    Reason for Visit:   Chief Complaint   Patient presents with    Follow-up      Interval History / Patient Symptoms:     Patient Health Questionnaire-9 Score: 5 (8/29/2023  5:00 PM)  RL-7 Total Score: 2 (8/29/2023  5:00 PM)      Interventions Provided: Motivational Interviewing, Strengths Exploration, and Homework F/U      Progress Made: Moderate    Response to Intervention: Patient completed the BAARS and brought to appointment.  M will score and review with PCP and consulting psychiatrist to identify if ADHD medication would be beneficial.  Patient discussed homework assignment of mundane task focusing, how it worked, and shared that she has continued to practice.  Lastly, had patient make a list of negative and positive things she thinks about herself; patient then had to change the negative into positive statement.  Patient struggled to switch to positive mindset initially, but then was able to explore a little deeper how these views of herself impact her self esteem.        Plan:     Provided Module 1 of Improving Self Esteem to complete and bring to next appointment.        Follow Up / Next Appointment: Next appointment: 09/12/23

## 2023-08-31 ENCOUNTER — APPOINTMENT (OUTPATIENT)
Dept: PRIMARY CARE | Facility: CLINIC | Age: 23
End: 2023-08-31
Payer: COMMERCIAL

## 2023-09-05 ENCOUNTER — DOCUMENTATION (OUTPATIENT)
Dept: PRIMARY CARE | Facility: CLINIC | Age: 23
End: 2023-09-05
Payer: COMMERCIAL

## 2023-09-05 DIAGNOSIS — F41.8 MIXED ANXIETY AND DEPRESSIVE DISORDER: Primary | ICD-10-CM

## 2023-09-05 PROCEDURE — 99494 1ST/SBSQ PSYC COLLAB CARE: CPT | Performed by: FAMILY MEDICINE

## 2023-09-05 PROCEDURE — 99493 SBSQ PSYC COLLAB CARE MGMT: CPT | Performed by: FAMILY MEDICINE

## 2023-09-12 ENCOUNTER — APPOINTMENT (OUTPATIENT)
Dept: PRIMARY CARE | Facility: CLINIC | Age: 23
End: 2023-09-12
Payer: COMMERCIAL

## 2023-09-12 ENCOUNTER — TELEPHONE (OUTPATIENT)
Dept: PRIMARY CARE | Facility: CLINIC | Age: 23
End: 2023-09-12

## 2023-09-19 DIAGNOSIS — I49.3 FREQUENT PVCS: ICD-10-CM

## 2023-09-26 ENCOUNTER — APPOINTMENT (OUTPATIENT)
Dept: PRIMARY CARE | Facility: CLINIC | Age: 23
End: 2023-09-26
Payer: COMMERCIAL

## 2023-09-28 ENCOUNTER — PATIENT MESSAGE (OUTPATIENT)
Dept: PRIMARY CARE | Facility: CLINIC | Age: 23
End: 2023-09-28
Payer: COMMERCIAL

## 2023-10-04 ENCOUNTER — HOSPITAL ENCOUNTER (EMERGENCY)
Facility: HOSPITAL | Age: 23
Discharge: HOME | End: 2023-10-04
Attending: GENERAL PRACTICE
Payer: COMMERCIAL

## 2023-10-04 VITALS
WEIGHT: 285 LBS | BODY MASS INDEX: 50.5 KG/M2 | RESPIRATION RATE: 18 BRPM | TEMPERATURE: 98.5 F | HEART RATE: 79 BPM | HEIGHT: 63 IN | OXYGEN SATURATION: 99 % | SYSTOLIC BLOOD PRESSURE: 153 MMHG | DIASTOLIC BLOOD PRESSURE: 108 MMHG

## 2023-10-04 DIAGNOSIS — B08.4 HAND, FOOT AND MOUTH DISEASE: Primary | ICD-10-CM

## 2023-10-04 LAB
ALBUMIN SERPL BCP-MCNC: 4 G/DL (ref 3.4–5)
ALP SERPL-CCNC: 48 U/L (ref 33–110)
ALT SERPL W P-5'-P-CCNC: 38 U/L (ref 7–45)
ANION GAP SERPL CALC-SCNC: 15 MMOL/L (ref 10–20)
AST SERPL W P-5'-P-CCNC: 33 U/L (ref 9–39)
B-HCG SERPL-ACNC: <2 MIU/ML
BILIRUB SERPL-MCNC: 0.3 MG/DL (ref 0–1.2)
BUN SERPL-MCNC: 7 MG/DL (ref 6–23)
CALCIUM SERPL-MCNC: 8.8 MG/DL (ref 8.6–10.3)
CHLORIDE SERPL-SCNC: 103 MMOL/L (ref 98–107)
CO2 SERPL-SCNC: 23 MMOL/L (ref 21–32)
CREAT SERPL-MCNC: 0.72 MG/DL (ref 0.5–1.05)
CRP SERPL-MCNC: 3.09 MG/DL
ERYTHROCYTE [DISTWIDTH] IN BLOOD BY AUTOMATED COUNT: 13.2 % (ref 11.5–14.5)
ERYTHROCYTE [SEDIMENTATION RATE] IN BLOOD BY WESTERGREN METHOD: 56 MM/H (ref 0–20)
GFR SERPL CREATININE-BSD FRML MDRD: >90 ML/MIN/1.73M*2
GLUCOSE SERPL-MCNC: 72 MG/DL (ref 74–99)
HCT VFR BLD AUTO: 42.4 % (ref 36–46)
HGB BLD-MCNC: 13.3 G/DL (ref 12–16)
MCH RBC QN AUTO: 26.3 PG (ref 26–34)
MCHC RBC AUTO-ENTMCNC: 31.4 G/DL (ref 32–36)
MCV RBC AUTO: 84 FL (ref 80–100)
NRBC BLD-RTO: 0 /100 WBCS (ref 0–0)
PLATELET # BLD AUTO: 218 X10*3/UL (ref 150–450)
PMV BLD AUTO: 10.3 FL (ref 7.5–11.5)
POTASSIUM SERPL-SCNC: 3.7 MMOL/L (ref 3.5–5.3)
PROT SERPL-MCNC: 6.9 G/DL (ref 6.4–8.2)
RBC # BLD AUTO: 5.06 X10*6/UL (ref 4–5.2)
SODIUM SERPL-SCNC: 137 MMOL/L (ref 136–145)
WBC # BLD AUTO: 4.8 X10*3/UL (ref 4.4–11.3)

## 2023-10-04 PROCEDURE — 84702 CHORIONIC GONADOTROPIN TEST: CPT | Performed by: GENERAL PRACTICE

## 2023-10-04 PROCEDURE — 80053 COMPREHEN METABOLIC PANEL: CPT | Performed by: GENERAL PRACTICE

## 2023-10-04 PROCEDURE — 96374 THER/PROPH/DIAG INJ IV PUSH: CPT

## 2023-10-04 PROCEDURE — 2500000004 HC RX 250 GENERAL PHARMACY W/ HCPCS (ALT 636 FOR OP/ED): Performed by: GENERAL PRACTICE

## 2023-10-04 PROCEDURE — 96361 HYDRATE IV INFUSION ADD-ON: CPT

## 2023-10-04 PROCEDURE — 85027 COMPLETE CBC AUTOMATED: CPT | Performed by: GENERAL PRACTICE

## 2023-10-04 PROCEDURE — 86140 C-REACTIVE PROTEIN: CPT | Performed by: GENERAL PRACTICE

## 2023-10-04 PROCEDURE — 96375 TX/PRO/DX INJ NEW DRUG ADDON: CPT

## 2023-10-04 PROCEDURE — 36415 COLL VENOUS BLD VENIPUNCTURE: CPT | Performed by: GENERAL PRACTICE

## 2023-10-04 PROCEDURE — 99284 EMERGENCY DEPT VISIT MOD MDM: CPT | Performed by: GENERAL PRACTICE

## 2023-10-04 PROCEDURE — 85652 RBC SED RATE AUTOMATED: CPT | Performed by: GENERAL PRACTICE

## 2023-10-04 RX ORDER — KETOROLAC TROMETHAMINE 30 MG/ML
30 INJECTION, SOLUTION INTRAMUSCULAR; INTRAVENOUS ONCE
Status: COMPLETED | OUTPATIENT
Start: 2023-10-04 | End: 2023-10-04

## 2023-10-04 RX ORDER — DIPHENHYDRAMINE HYDROCHLORIDE 50 MG/ML
50 INJECTION INTRAMUSCULAR; INTRAVENOUS ONCE
Status: COMPLETED | OUTPATIENT
Start: 2023-10-04 | End: 2023-10-04

## 2023-10-04 RX ADMIN — KETOROLAC TROMETHAMINE 30 MG: 30 INJECTION INTRAMUSCULAR; INTRAVENOUS at 14:39

## 2023-10-04 RX ADMIN — DIPHENHYDRAMINE HYDROCHLORIDE 50 MG: 50 INJECTION, SOLUTION INTRAMUSCULAR; INTRAVENOUS at 14:39

## 2023-10-04 RX ADMIN — SODIUM CHLORIDE 2000 ML: 9 INJECTION, SOLUTION INTRAVENOUS at 14:38

## 2023-10-04 ASSESSMENT — PAIN SCALES - GENERAL: PAINLEVEL_OUTOF10: 8

## 2023-10-04 ASSESSMENT — COLUMBIA-SUICIDE SEVERITY RATING SCALE - C-SSRS
6. HAVE YOU EVER DONE ANYTHING, STARTED TO DO ANYTHING, OR PREPARED TO DO ANYTHING TO END YOUR LIFE?: NO
1. IN THE PAST MONTH, HAVE YOU WISHED YOU WERE DEAD OR WISHED YOU COULD GO TO SLEEP AND NOT WAKE UP?: NO
2. HAVE YOU ACTUALLY HAD ANY THOUGHTS OF KILLING YOURSELF?: NO

## 2023-10-04 ASSESSMENT — PAIN - FUNCTIONAL ASSESSMENT: PAIN_FUNCTIONAL_ASSESSMENT: 0-10

## 2023-10-05 ENCOUNTER — TELEPHONE (OUTPATIENT)
Dept: PRIMARY CARE | Facility: CLINIC | Age: 23
End: 2023-10-05
Payer: COMMERCIAL

## 2023-10-05 NOTE — TELEPHONE ENCOUNTER
Patient called into the office, states she went to the ER yesterday, she was DX with hand foot and mouth, she is asking if there is anything she can do? Does she need to see you if things do not get better?

## 2023-10-05 NOTE — ED PROVIDER NOTES
HPI   Chief Complaint   Patient presents with    Dehydration     Pt was diagnosed by [facility] clinic with hand/foot/mouth. Pt states she is having pain and is unable to drink/eat.       HPI  HPI: 23-year-old female with no significant past medical history presents for difficulty swallowing secondary to hand-foot-and-mouth.  She was diagnosed with hand-foot-and-mouth disease as an outpatient approximately 4 days ago.  She reports itching over the lesions but presents to the ED because she is having mouth pain which is making it difficult to swallow.  She denies fever, chills and states that she otherwise feels well.      Limitations to history: None  Independent Historians: Patient  External Records Reviewed: HIE, outpatient notes, inpatient notes  ------------------------------------------------------------------------------------------------------------------------------------------  ROS: a ten point review of systems was performed and was negative except as per HPI.  ------------------------------------------------------------------------------------------------------------------------------------------  PMH / PSH: as per HPI, otherwise reviewed in EMR  MEDS: as per HPI, otherwise reviewed in EMR  ALLERGIES: as per HPI, otherwise reviewed in EMR  SocH:  as per HPI, otherwise reviewed in EMR  FH:  as per HPI, otherwise reviewed in EMR  ------------------------------------------------------------------------------------------------------------------------------------------  Physical Exam:  VS: As documented in the triage note and EMR flowsheet from this visit was reviewed  General: Well appearing. No acute distress.   Eyes:  Extraocular movements grossly intact. No scleral icterus. No discharge  HEENT:  Normocephalic.  Atraumatic.  Occasional lesions over the inside of the patient's buccal mucosa  Neck: Moves neck freely. No gross masses  CV: Regular rhythm. No murmurs, rubs or gallops   Resp: Clear to auscultation  bilaterally. No respiratory distress.    GI: Soft, no masses, nontender. No rebound tenderness or guarding  MSK: Symmetric muscle bulk. No deformities. No lower extremity edema.    Skin: Warm, dry, intact.  There are erythematous lesions that new with pressure over the patient's palms, distal upper extremities and face  Neuro: No focal deficits.  A&O x3.   Psych: Appropriate for situation  ------------------------------------------------------------------------------------------------------------------------------------------  Hospital Course / Medical Decision Making:  Independent Interpretations: NA  EKG as interpreted by me: NA    MDM: This is a 23-year-old female with a history of PCOS presenting for hand-foot-and-mouth disease.  She is in no distress and is well-appearing.  ESR and CRP mildly elevated.  Beta hCG not elevated.  No major abnormalities on CBC or CMP.  She is afebrile.  She was given medication for pain in the ED and is able to tolerate oral intake.  She wants to go home and follow-up with her primary care physician.  She will continue to use Benadryl and over-the-counter medications as needed for analgesia and itching and will return to the ED for any concerning symptoms.    Discussion of Management with Other Providers:   I discussed the patient/results with: Emergency medicine team    Final diagnosis and disposition as below.    Results for orders placed or performed during the hospital encounter of 10/04/23   CBC   Result Value Ref Range    WBC 4.8 4.4 - 11.3 x10*3/uL    nRBC 0.0 0.0 - 0.0 /100 WBCs    RBC 5.06 4.00 - 5.20 x10*6/uL    Hemoglobin 13.3 12.0 - 16.0 g/dL    Hematocrit 42.4 36.0 - 46.0 %    MCV 84 80 - 100 fL    MCH 26.3 26.0 - 34.0 pg    MCHC 31.4 (L) 32.0 - 36.0 g/dL    RDW 13.2 11.5 - 14.5 %    Platelets 218 150 - 450 x10*3/uL    MPV 10.3 7.5 - 11.5 fL   Comprehensive metabolic panel   Result Value Ref Range    Glucose 72 (L) 74 - 99 mg/dL    Sodium 137 136 - 145 mmol/L     Potassium 3.7 3.5 - 5.3 mmol/L    Chloride 103 98 - 107 mmol/L    Bicarbonate 23 21 - 32 mmol/L    Anion Gap 15 10 - 20 mmol/L    Urea Nitrogen 7 6 - 23 mg/dL    Creatinine 0.72 0.50 - 1.05 mg/dL    eGFR >90 >60 mL/min/1.73m*2    Calcium 8.8 8.6 - 10.3 mg/dL    Albumin 4.0 3.4 - 5.0 g/dL    Alkaline Phosphatase 48 33 - 110 U/L    Total Protein 6.9 6.4 - 8.2 g/dL    AST 33 9 - 39 U/L    Bilirubin, Total 0.3 0.0 - 1.2 mg/dL    ALT 38 7 - 45 U/L   Human Chorionic Gonadotropin, Serum Quantitative   Result Value Ref Range    HCG, Beta-Quantitative <2 <5 mIU/mL   Sedimentation rate, automated   Result Value Ref Range    Sedimentation Rate 56 (H) 0 - 20 mm/h   C-reactive protein   Result Value Ref Range    C-Reactive Protein 3.09 (H) <1.00 mg/dL     No orders to display                     No data recorded                Patient History   Past Medical History:   Diagnosis Date    Acute upper respiratory infection, unspecified 11/02/2020    Acute URI    Personal history of other mental and behavioral disorders 12/28/2020    History of attention deficit disorder    Urinary tract infection, site not specified 07/31/2020    Acute UTI     Past Surgical History:   Procedure Laterality Date    OTHER SURGICAL HISTORY  06/21/2021    Tonsillectomy     Family History   Problem Relation Name Age of Onset    Anxiety disorder Mother      Migraines Mother      Alzheimer's disease Maternal Grandfather       Social History     Tobacco Use    Smoking status: Never    Smokeless tobacco: Never   Substance Use Topics    Alcohol use: Not Currently    Drug use: Never       Physical Exam   ED Triage Vitals [10/04/23 1104]   Temp Heart Rate Resp BP   36.9 °C (98.5 °F) 80 20 131/84      SpO2 Temp Source Heart Rate Source Patient Position   97 % Temporal -- Sitting      BP Location FiO2 (%)     Left arm --       Physical Exam    ED Course & MDM   Diagnoses as of 10/04/23 2107   Hand, foot and mouth disease       Medical Decision  Making      Procedure  Procedures     Avtar Stevens,   10/07/23 0488

## 2023-10-06 ENCOUNTER — HOSPITAL ENCOUNTER (OUTPATIENT)
Facility: CLINIC | Age: 23
Setting detail: OUTPATIENT SURGERY
End: 2023-10-06
Attending: DENTIST | Admitting: DENTIST
Payer: COMMERCIAL

## 2023-10-10 ENCOUNTER — SOCIAL WORK (OUTPATIENT)
Dept: PRIMARY CARE | Facility: CLINIC | Age: 23
End: 2023-10-10
Payer: COMMERCIAL

## 2023-10-10 DIAGNOSIS — F41.8 MIXED ANXIETY AND DEPRESSIVE DISORDER: Primary | ICD-10-CM

## 2023-10-10 ASSESSMENT — ANXIETY QUESTIONNAIRES
6. BECOMING EASILY ANNOYED OR IRRITABLE: MORE THAN HALF THE DAYS
GAD7 TOTAL SCORE: 8
1. FEELING NERVOUS, ANXIOUS, OR ON EDGE: NOT AT ALL
2. NOT BEING ABLE TO STOP OR CONTROL WORRYING: MORE THAN HALF THE DAYS
4. TROUBLE RELAXING: SEVERAL DAYS
7. FEELING AFRAID AS IF SOMETHING AWFUL MIGHT HAPPEN: MORE THAN HALF THE DAYS
3. WORRYING TOO MUCH ABOUT DIFFERENT THINGS: SEVERAL DAYS
IF YOU CHECKED OFF ANY PROBLEMS ON THIS QUESTIONNAIRE, HOW DIFFICULT HAVE THESE PROBLEMS MADE IT FOR YOU TO DO YOUR WORK, TAKE CARE OF THINGS AT HOME, OR GET ALONG WITH OTHER PEOPLE: NOT DIFFICULT AT ALL
5. BEING SO RESTLESS THAT IT IS HARD TO SIT STILL: NOT AT ALL

## 2023-10-10 ASSESSMENT — PATIENT HEALTH QUESTIONNAIRE - PHQ9
6. FEELING BAD ABOUT YOURSELF - OR THAT YOU ARE A FAILURE OR HAVE LET YOURSELF OR YOUR FAMILY DOWN: SEVERAL DAYS
SUM OF ALL RESPONSES TO PHQ9 QUESTIONS 1 & 2: 3
10. IF YOU CHECKED OFF ANY PROBLEMS, HOW DIFFICULT HAVE THESE PROBLEMS MADE IT FOR YOU TO DO YOUR WORK, TAKE CARE OF THINGS AT HOME, OR GET ALONG WITH OTHER PEOPLE: SOMEWHAT DIFFICULT
7. TROUBLE CONCENTRATING ON THINGS, SUCH AS READING THE NEWSPAPER OR WATCHING TELEVISION: MORE THAN HALF THE DAYS
2. FEELING DOWN, DEPRESSED OR HOPELESS: MORE THAN HALF THE DAYS
8. MOVING OR SPEAKING SO SLOWLY THAT OTHER PEOPLE COULD HAVE NOTICED. OR THE OPPOSITE, BEING SO FIGETY OR RESTLESS THAT YOU HAVE BEEN MOVING AROUND A LOT MORE THAN USUAL: NOT AT ALL
9. THOUGHTS THAT YOU WOULD BE BETTER OFF DEAD, OR OF HURTING YOURSELF: NOT AT ALL
SUM OF ALL RESPONSES TO PHQ QUESTIONS 1-9: 11
1. LITTLE INTEREST OR PLEASURE IN DOING THINGS: SEVERAL DAYS
3. TROUBLE FALLING OR STAYING ASLEEP: SEVERAL DAYS
4. FEELING TIRED OR HAVING LITTLE ENERGY: MORE THAN HALF THE DAYS
5. POOR APPETITE OR OVEREATING: MORE THAN HALF THE DAYS

## 2023-10-10 NOTE — PROGRESS NOTES
"Collaborative Care (CoCM)  Progress Note    Type of Interaction: In Office    Start Time: 5:12 PM    End Time: 6:24 PM    Appointment: Scheduled    Reason for Visit:   Chief Complaint   Patient presents with    Follow-up      Interval History / Patient Symptoms:     Patient Health Questionnaire-9 Score: 11 (10/10/2023  5:20 PM)  RL-7 Total Score: 8 (10/10/2023  5:19 PM)        Interventions Provided: Psychoeducation and Motivational Interviewing      Progress Made: Slight    Response to Intervention: Discussed patient's functioning since last visit.  Patient reports she was diagnosed with hand/foot/mouth and ended up going to the ER due to the symptoms.  Patient states she is feeling much better and is back to work.    Discussion led to talking about overall anxiety and how it affects patient's physical health.  Challenged patient to try to identify if her somatic sx are depression/anxiety related or physical health related by identifying fact and fiction thoughts.    Explored patient's routines during work and when she gets home.  Brainstormed ways to manage fatigue at the end of the day, and setting limits with how long she can rest after work- patient decided to take a shower and a short (20min) nap when she gets home, and then be with family.  Patient reports that she is going to try this schedule change.  Also talked about not making too many changes at one time; wait for this to become routine prior to making any other routine changes.      Plan:     Check out the book \"365 Energy Boosters\" by Bailee Castaneda  Monitor somatic sx- try to identify if sx are anxiety/depression related or physical illness  Try new routine when coming home from work      Follow Up / Next Appointment: Next appointment: 10/24/23      "

## 2023-10-15 ASSESSMENT — PROMIS GLOBAL HEALTH SCALE
RATE_PHYSICAL_HEALTH: GOOD
RATE_AVERAGE_PAIN: 2
CARRYOUT_PHYSICAL_ACTIVITIES: MODERATELY
EMOTIONAL_PROBLEMS: SOMETIMES
RATE_MENTAL_HEALTH: FAIR
CARRYOUT_SOCIAL_ACTIVITIES: VERY GOOD
RATE_SOCIAL_SATISFACTION: GOOD
RATE_GENERAL_HEALTH: GOOD
RATE_QUALITY_OF_LIFE: VERY GOOD
RATE_AVERAGE_FATIGUE: MILD

## 2023-10-19 ENCOUNTER — OFFICE VISIT (OUTPATIENT)
Dept: PRIMARY CARE | Facility: CLINIC | Age: 23
End: 2023-10-19
Payer: COMMERCIAL

## 2023-10-19 VITALS
BODY MASS INDEX: 50.5 KG/M2 | OXYGEN SATURATION: 98 % | HEART RATE: 89 BPM | DIASTOLIC BLOOD PRESSURE: 80 MMHG | SYSTOLIC BLOOD PRESSURE: 120 MMHG | HEIGHT: 63 IN | WEIGHT: 285 LBS

## 2023-10-19 DIAGNOSIS — Z00.00 ANNUAL PHYSICAL EXAM: Primary | ICD-10-CM

## 2023-10-19 DIAGNOSIS — E66.01 CLASS 3 SEVERE OBESITY DUE TO EXCESS CALORIES WITHOUT SERIOUS COMORBIDITY WITH BODY MASS INDEX (BMI) OF 50.0 TO 59.9 IN ADULT (MULTI): ICD-10-CM

## 2023-10-19 DIAGNOSIS — F33.41 RECURRENT MAJOR DEPRESSIVE DISORDER, IN PARTIAL REMISSION (CMS-HCC): ICD-10-CM

## 2023-10-19 DIAGNOSIS — F41.9 ANXIETY: ICD-10-CM

## 2023-10-19 DIAGNOSIS — F41.8 MIXED ANXIETY AND DEPRESSIVE DISORDER: ICD-10-CM

## 2023-10-19 DIAGNOSIS — N92.1 MENORRHAGIA WITH IRREGULAR CYCLE: ICD-10-CM

## 2023-10-19 DIAGNOSIS — E28.2 POLYCYSTIC OVARIAN SYNDROME: ICD-10-CM

## 2023-10-19 PROCEDURE — 99395 PREV VISIT EST AGE 18-39: CPT | Performed by: FAMILY MEDICINE

## 2023-10-19 PROCEDURE — 3008F BODY MASS INDEX DOCD: CPT | Performed by: FAMILY MEDICINE

## 2023-10-19 PROCEDURE — 99213 OFFICE O/P EST LOW 20 MIN: CPT | Performed by: FAMILY MEDICINE

## 2023-10-19 PROCEDURE — 1036F TOBACCO NON-USER: CPT | Performed by: FAMILY MEDICINE

## 2023-10-19 RX ORDER — ESCITALOPRAM OXALATE 20 MG/1
40 TABLET ORAL DAILY
Qty: 180 TABLET | Refills: 3 | Status: SHIPPED | OUTPATIENT
Start: 2023-10-19 | End: 2024-03-12 | Stop reason: DRUGHIGH

## 2023-10-19 RX ORDER — NORETHINDRONE ACETATE AND ETHINYL ESTRADIOL 1MG-20(21)
1 KIT ORAL DAILY
Qty: 84 TABLET | Refills: 3 | Status: SHIPPED | OUTPATIENT
Start: 2023-10-19

## 2023-10-19 RX ORDER — METFORMIN HYDROCHLORIDE 500 MG/1
500 TABLET ORAL 2 TIMES DAILY
Qty: 180 TABLET | Refills: 1 | Status: SHIPPED | OUTPATIENT
Start: 2023-10-19 | End: 2024-05-20

## 2023-10-19 ASSESSMENT — PATIENT HEALTH QUESTIONNAIRE - PHQ9
1. LITTLE INTEREST OR PLEASURE IN DOING THINGS: SEVERAL DAYS
2. FEELING DOWN, DEPRESSED OR HOPELESS: SEVERAL DAYS
9. THOUGHTS THAT YOU WOULD BE BETTER OFF DEAD, OR OF HURTING YOURSELF: NOT AT ALL
4. FEELING TIRED OR HAVING LITTLE ENERGY: SEVERAL DAYS
3. TROUBLE FALLING OR STAYING ASLEEP OR SLEEPING TOO MUCH: SEVERAL DAYS
SUM OF ALL RESPONSES TO PHQ9 QUESTIONS 1 AND 2: 2
8. MOVING OR SPEAKING SO SLOWLY THAT OTHER PEOPLE COULD HAVE NOTICED. OR THE OPPOSITE, BEING SO FIGETY OR RESTLESS THAT YOU HAVE BEEN MOVING AROUND A LOT MORE THAN USUAL: NOT AT ALL
10. IF YOU CHECKED OFF ANY PROBLEMS, HOW DIFFICULT HAVE THESE PROBLEMS MADE IT FOR YOU TO DO YOUR WORK, TAKE CARE OF THINGS AT HOME, OR GET ALONG WITH OTHER PEOPLE: SOMEWHAT DIFFICULT
6. FEELING BAD ABOUT YOURSELF - OR THAT YOU ARE A FAILURE OR HAVE LET YOURSELF OR YOUR FAMILY DOWN: NOT AT ALL
5. POOR APPETITE OR OVEREATING: SEVERAL DAYS
SUM OF ALL RESPONSES TO PHQ QUESTIONS 1-9: 5
7. TROUBLE CONCENTRATING ON THINGS, SUCH AS READING THE NEWSPAPER OR WATCHING TELEVISION: NOT AT ALL

## 2023-10-19 ASSESSMENT — ANXIETY QUESTIONNAIRES
2. NOT BEING ABLE TO STOP OR CONTROL WORRYING: NOT AT ALL
7. FEELING AFRAID AS IF SOMETHING AWFUL MIGHT HAPPEN: SEVERAL DAYS
4. TROUBLE RELAXING: NOT AT ALL
IF YOU CHECKED OFF ANY PROBLEMS ON THIS QUESTIONNAIRE, HOW DIFFICULT HAVE THESE PROBLEMS MADE IT FOR YOU TO DO YOUR WORK, TAKE CARE OF THINGS AT HOME, OR GET ALONG WITH OTHER PEOPLE: NOT DIFFICULT AT ALL
6. BECOMING EASILY ANNOYED OR IRRITABLE: MORE THAN HALF THE DAYS
GAD7 TOTAL SCORE: 5
5. BEING SO RESTLESS THAT IT IS HARD TO SIT STILL: NOT AT ALL
1. FEELING NERVOUS, ANXIOUS, OR ON EDGE: SEVERAL DAYS
3. WORRYING TOO MUCH ABOUT DIFFERENT THINGS: SEVERAL DAYS

## 2023-10-19 ASSESSMENT — LIFESTYLE VARIABLES
SKIP TO QUESTIONS 9-10: 1
HOW MANY STANDARD DRINKS CONTAINING ALCOHOL DO YOU HAVE ON A TYPICAL DAY: PATIENT DOES NOT DRINK
HOW OFTEN DO YOU HAVE SIX OR MORE DRINKS ON ONE OCCASION: NEVER
AUDIT-C TOTAL SCORE: 0
HOW OFTEN DO YOU HAVE A DRINK CONTAINING ALCOHOL: NEVER

## 2023-10-19 NOTE — PROGRESS NOTES
Subjective   Samantha Sunshine is a 23 y.o. female and is here for a comprehensive physical exam and the following concerns:    SKIN LESION: left upper thigh  Not painful or itchy  No change in size or appearance    RECENT ED: dx with hand-foot-mouth a few weeks ago    ANXIETY & DEPRESSION:   much improved on Escitalopram - working with Behavioral health Collaborative care team  Meets with Josefina every 2 weeks or so    PCOS: tolerating Metformin without side effect  Menstrual cycles regular since back on BCPs    PVC's: tolerating metoprolol    Do you take any herbs or supplements that were not prescribed by a doctor? no  Are you taking calcium supplements? no  Are you taking aspirin daily? no    SOC Hx:   Tobacco Use: Low Risk  (10/19/2023)    Patient History     Smoking Tobacco Use: Never     Smokeless Tobacco Use: Never     Passive Exposure: Not on file     Alcohol Use: Not At Risk (10/19/2023)    AUDIT-C     Frequency of Alcohol Consumption: Never     Average Number of Drinks: Patient does not drink     Frequency of Binge Drinking: Never     DIET: regular, well rounded    EXERCISE: The patient does not participate in regular exercise at present.  Trying to play basketball a few nights per week    ELIMINATION: no constipation or diarrhea   GERD managed with Omeprazole    No urinary issues    SLEEP: no issues    MOODS: anxiety & depression doing better on Escitalopram  Followed by Collaborative care Josefina - ever 2 weeks.  PHQ-9: Patient Health Questionnaire-9 Score: 5    RL-7: RL-7 Total Score: 5     OB/GYN:   No known family history of breast cancer.  Menstrual cycles - regular on BCPs, much lighter flow  Cramping prior to cycle starting      History:  LMP: No LMP recorded.  Last pap date: scheduled with ob/gyn    Review of Systems   Review of Systems negative except as noted in HPI and Chief complaint.     Objective     /80 (BP Location: Left arm, Patient Position: Sitting, BP Cuff Size: Adult)   Pulse 89   " Ht 1.6 m (5' 3\")   Wt 129 kg (285 lb)   SpO2 98%   BMI 50.49 kg/m²      Physical Exam  Constitutional:       General: She is not in acute distress.     Appearance: Normal appearance.   HENT:      Head: Normocephalic and atraumatic.      Right Ear: Tympanic membrane and ear canal normal.      Left Ear: Tympanic membrane and ear canal normal.      Mouth/Throat:      Mouth: Mucous membranes are moist.   Eyes:      Conjunctiva/sclera: Conjunctivae normal.      Pupils: Pupils are equal, round, and reactive to light.   Neck:      Vascular: No carotid bruit.   Cardiovascular:      Rate and Rhythm: Normal rate and regular rhythm.      Heart sounds: No murmur heard.  Pulmonary:      Effort: Pulmonary effort is normal.      Breath sounds: Normal breath sounds. No wheezing or rhonchi.   Abdominal:      General: Bowel sounds are normal.      Palpations: Abdomen is soft.   Musculoskeletal:         General: No swelling.      Cervical back: No rigidity.   Lymphadenopathy:      Cervical: No cervical adenopathy.   Skin:     General: Skin is warm and dry.      Findings: No rash.   Neurological:      Mental Status: She is alert.     Assessment/Plan   Healthy female exam.      1. Please have labs drawn at your earliest convenience.  You should fast 12 hours prior to arriving at the lab - during these 12 hours you may have water, black coffee, black tea - nothing with cream or sugar and no solid foods.    Our office will contact you with results after they have been reviewed.  Please allow 48 - 72 hours for most results, some may take longer.  Please keep in mind that results may post immediately to your online portal, even before we have a chance to review them.  Once we have had the opportunity to review we will post comments and have our staff contact you with results and any instructions.  Please call our office if you do not hear from our office in 7 days.     2. Patient Counseling:  --Nutrition: Stressed importance of " moderation in sodium/caffeine intake, saturated fat and cholesterol, caloric balance, sufficient intake of fresh fruits, vegetables, fiber, calcium, iron, and 1 mg of folate supplement per day (for females capable of pregnancy).  --Exercise: Stressed the importance of regular exercise.   --Immunizations reviewed.  --Discussed benefits of screening colonoscopy.    3. Discussed the patient's BMI with her.  The BMI is above average. The patient received Provided instructions on dietary changes  Provided instructions on exercise  Advised to Increase physical activity  Also given list of weight loss medications to look into  because they have an above normal BMI.    Problem List Items Addressed This Visit       Anxiety    Menorrhagia    Relevant Medications    norethindrone-e.estradioL-iron (Microgestin FE 1/20, 28,) 1 mg-20 mcg (21)/75 mg (7) tablet    Class 3 severe obesity due to excess calories without serious comorbidity with body mass index (BMI) of 50.0 to 59.9 in adult (CMS/[facility])    Relevant Orders    Referral to Population [facility]    Recurrent major depressive disorder (CMS/[facility])    Annual physical exam - Primary    Relevant Orders    CBC    Comprehensive Metabolic Panel    Lipid Panel    TSH with reflex to Free T4 if abnormal    Mixed anxiety and depressive disorder    Relevant Medications    escitalopram (Lexapro) 20 mg tablet     Other Visit Diagnoses       Polycystic ovarian syndrome        Relevant Medications    metFORMIN (Glucophage) 500 mg tablet             Follow up  4-6 months, sooner with any problems or concerns.

## 2023-10-24 ENCOUNTER — SOCIAL WORK (OUTPATIENT)
Dept: PRIMARY CARE | Facility: CLINIC | Age: 23
End: 2023-10-24
Payer: COMMERCIAL

## 2023-10-24 DIAGNOSIS — F41.8 MIXED ANXIETY AND DEPRESSIVE DISORDER: Primary | ICD-10-CM

## 2023-10-24 ASSESSMENT — PATIENT HEALTH QUESTIONNAIRE - PHQ9
SUM OF ALL RESPONSES TO PHQ QUESTIONS 1-9: 3
4. FEELING TIRED OR HAVING LITTLE ENERGY: NOT AT ALL
1. LITTLE INTEREST OR PLEASURE IN DOING THINGS: NOT AT ALL
10. IF YOU CHECKED OFF ANY PROBLEMS, HOW DIFFICULT HAVE THESE PROBLEMS MADE IT FOR YOU TO DO YOUR WORK, TAKE CARE OF THINGS AT HOME, OR GET ALONG WITH OTHER PEOPLE: NOT DIFFICULT AT ALL
8. MOVING OR SPEAKING SO SLOWLY THAT OTHER PEOPLE COULD HAVE NOTICED. OR THE OPPOSITE, BEING SO FIGETY OR RESTLESS THAT YOU HAVE BEEN MOVING AROUND A LOT MORE THAN USUAL: NOT AT ALL
3. TROUBLE FALLING OR STAYING ASLEEP: SEVERAL DAYS
5. POOR APPETITE OR OVEREATING: SEVERAL DAYS
6. FEELING BAD ABOUT YOURSELF - OR THAT YOU ARE A FAILURE OR HAVE LET YOURSELF OR YOUR FAMILY DOWN: SEVERAL DAYS
2. FEELING DOWN, DEPRESSED OR HOPELESS: NOT AT ALL
SUM OF ALL RESPONSES TO PHQ9 QUESTIONS 1 & 2: 0
9. THOUGHTS THAT YOU WOULD BE BETTER OFF DEAD, OR OF HURTING YOURSELF: NOT AT ALL
7. TROUBLE CONCENTRATING ON THINGS, SUCH AS READING THE NEWSPAPER OR WATCHING TELEVISION: NOT AT ALL

## 2023-10-24 ASSESSMENT — ANXIETY QUESTIONNAIRES
1. FEELING NERVOUS, ANXIOUS, OR ON EDGE: SEVERAL DAYS
6. BECOMING EASILY ANNOYED OR IRRITABLE: SEVERAL DAYS
4. TROUBLE RELAXING: NOT AT ALL
IF YOU CHECKED OFF ANY PROBLEMS ON THIS QUESTIONNAIRE, HOW DIFFICULT HAVE THESE PROBLEMS MADE IT FOR YOU TO DO YOUR WORK, TAKE CARE OF THINGS AT HOME, OR GET ALONG WITH OTHER PEOPLE: NOT DIFFICULT AT ALL
3. WORRYING TOO MUCH ABOUT DIFFERENT THINGS: SEVERAL DAYS
5. BEING SO RESTLESS THAT IT IS HARD TO SIT STILL: NOT AT ALL
2. NOT BEING ABLE TO STOP OR CONTROL WORRYING: NOT AT ALL
7. FEELING AFRAID AS IF SOMETHING AWFUL MIGHT HAPPEN: SEVERAL DAYS
GAD7 TOTAL SCORE: 4

## 2023-10-24 NOTE — PROGRESS NOTES
Collaborative Care (CoCM)  Progress Note    Type of Interaction: In Office    Start Time: 5:05 PM    End Time: 6:00 PM    Appointment: Scheduled    Reason for Visit:   Chief Complaint   Patient presents with    Follow-up      Interval History / Patient Symptoms:     Patient Health Questionnaire-9 Score: 3 (10/24/2023  5:08 PM)  RL-7 Total Score: 4 (10/24/2023  5:07 PM)    Interventions Provided: CBT    Progress Made: Slight    Response to Intervention: Discussed recent concern of patient's anxiety spiking after she went to a party with her boyfriend.  Patient reports having a few drinks and being embarrassed about her behavior/actions.  Patient stated that the following day, she had a hard time letting go of her thoughts of the night before.    Discussed anxiety, and how it makes reality become distorted, as well as identifying the truth and fiction when having anxious thoughts and the importance of not letting mind wander to the fictitious.    Routine has been going well and is motivated to keep doing it    Plan:   Work on building self compassion module 2    Follow Up / Next Appointment: Next appointment: 11/08/23

## 2023-10-30 ENCOUNTER — DOCUMENTATION (OUTPATIENT)
Dept: PRIMARY CARE | Facility: CLINIC | Age: 23
End: 2023-10-30
Payer: COMMERCIAL

## 2023-10-30 DIAGNOSIS — F41.8 MIXED ANXIETY AND DEPRESSIVE DISORDER: Primary | ICD-10-CM

## 2023-10-30 PROCEDURE — 99493 SBSQ PSYC COLLAB CARE MGMT: CPT | Performed by: FAMILY MEDICINE

## 2023-10-30 PROCEDURE — 99494 1ST/SBSQ PSYC COLLAB CARE: CPT | Performed by: FAMILY MEDICINE

## 2023-11-01 ENCOUNTER — DOCUMENTATION (OUTPATIENT)
Dept: CARE COORDINATION | Facility: CLINIC | Age: 23
End: 2023-11-01
Payer: COMMERCIAL

## 2023-11-01 NOTE — PROGRESS NOTES
Pt ref to [facility] RD for desired wt loss by PCP. This RD attempted to reach pt twice without success. LVMs with this RD's information. Will stop outreach attempts at this time. Pt may return call if she wishes to schedule with this RD

## 2023-11-06 ENCOUNTER — PATIENT MESSAGE (OUTPATIENT)
Dept: PRIMARY CARE | Facility: CLINIC | Age: 23
End: 2023-11-06
Payer: COMMERCIAL

## 2023-11-07 ENCOUNTER — APPOINTMENT (OUTPATIENT)
Dept: PRIMARY CARE | Facility: CLINIC | Age: 23
End: 2023-11-07
Payer: COMMERCIAL

## 2023-11-08 ENCOUNTER — APPOINTMENT (OUTPATIENT)
Dept: PRIMARY CARE | Facility: CLINIC | Age: 23
End: 2023-11-08
Payer: COMMERCIAL

## 2023-11-10 ENCOUNTER — HOSPITAL ENCOUNTER (OUTPATIENT)
Facility: CLINIC | Age: 23
Setting detail: OUTPATIENT SURGERY
End: 2023-11-10
Attending: DENTIST | Admitting: DENTIST
Payer: COMMERCIAL

## 2023-11-13 ENCOUNTER — APPOINTMENT (OUTPATIENT)
Dept: PRIMARY CARE | Facility: CLINIC | Age: 23
End: 2023-11-13
Payer: COMMERCIAL

## 2023-11-21 ENCOUNTER — APPOINTMENT (OUTPATIENT)
Dept: PRIMARY CARE | Facility: CLINIC | Age: 23
End: 2023-11-21
Payer: COMMERCIAL

## 2023-11-22 ENCOUNTER — PATIENT MESSAGE (OUTPATIENT)
Dept: PRIMARY CARE | Facility: CLINIC | Age: 23
End: 2023-11-22
Payer: COMMERCIAL

## 2023-11-24 RX ORDER — METOPROLOL SUCCINATE 50 MG/1
50 TABLET, EXTENDED RELEASE ORAL DAILY
Qty: 90 TABLET | Refills: 1 | Status: SHIPPED | OUTPATIENT
Start: 2023-11-24 | End: 2024-01-09

## 2023-12-05 ENCOUNTER — SOCIAL WORK (OUTPATIENT)
Dept: PRIMARY CARE | Facility: CLINIC | Age: 23
End: 2023-12-05
Payer: COMMERCIAL

## 2023-12-05 DIAGNOSIS — F41.8 MIXED ANXIETY AND DEPRESSIVE DISORDER: Primary | ICD-10-CM

## 2023-12-05 ASSESSMENT — PATIENT HEALTH QUESTIONNAIRE - PHQ9
5. POOR APPETITE OR OVEREATING: MORE THAN HALF THE DAYS
3. TROUBLE FALLING OR STAYING ASLEEP: MORE THAN HALF THE DAYS
7. TROUBLE CONCENTRATING ON THINGS, SUCH AS READING THE NEWSPAPER OR WATCHING TELEVISION: NOT AT ALL
SUM OF ALL RESPONSES TO PHQ QUESTIONS 1-9: 7
4. FEELING TIRED OR HAVING LITTLE ENERGY: MORE THAN HALF THE DAYS
8. MOVING OR SPEAKING SO SLOWLY THAT OTHER PEOPLE COULD HAVE NOTICED. OR THE OPPOSITE, BEING SO FIGETY OR RESTLESS THAT YOU HAVE BEEN MOVING AROUND A LOT MORE THAN USUAL: NOT AT ALL
6. FEELING BAD ABOUT YOURSELF - OR THAT YOU ARE A FAILURE OR HAVE LET YOURSELF OR YOUR FAMILY DOWN: NOT AT ALL
10. IF YOU CHECKED OFF ANY PROBLEMS, HOW DIFFICULT HAVE THESE PROBLEMS MADE IT FOR YOU TO DO YOUR WORK, TAKE CARE OF THINGS AT HOME, OR GET ALONG WITH OTHER PEOPLE: SOMEWHAT DIFFICULT
2. FEELING DOWN, DEPRESSED OR HOPELESS: SEVERAL DAYS
1. LITTLE INTEREST OR PLEASURE IN DOING THINGS: NOT AT ALL
SUM OF ALL RESPONSES TO PHQ9 QUESTIONS 1 & 2: 1
9. THOUGHTS THAT YOU WOULD BE BETTER OFF DEAD, OR OF HURTING YOURSELF: NOT AT ALL

## 2023-12-05 ASSESSMENT — ANXIETY QUESTIONNAIRES
IF YOU CHECKED OFF ANY PROBLEMS ON THIS QUESTIONNAIRE, HOW DIFFICULT HAVE THESE PROBLEMS MADE IT FOR YOU TO DO YOUR WORK, TAKE CARE OF THINGS AT HOME, OR GET ALONG WITH OTHER PEOPLE: SOMEWHAT DIFFICULT
4. TROUBLE RELAXING: NOT AT ALL
7. FEELING AFRAID AS IF SOMETHING AWFUL MIGHT HAPPEN: MORE THAN HALF THE DAYS
5. BEING SO RESTLESS THAT IT IS HARD TO SIT STILL: NOT AT ALL
2. NOT BEING ABLE TO STOP OR CONTROL WORRYING: MORE THAN HALF THE DAYS
1. FEELING NERVOUS, ANXIOUS, OR ON EDGE: SEVERAL DAYS
3. WORRYING TOO MUCH ABOUT DIFFERENT THINGS: SEVERAL DAYS
GAD7 TOTAL SCORE: 7
6. BECOMING EASILY ANNOYED OR IRRITABLE: SEVERAL DAYS

## 2023-12-05 NOTE — PROGRESS NOTES
Collaborative Care (CoCM)  Progress Note    Type of Interaction: In Office    Start Time: 5:05 PM    End Time: 6:10 PM    Appointment: Scheduled    Reason for Visit:   Chief Complaint   Patient presents with    Follow-up      Interval History / Patient Symptoms:     Patient Health Questionnaire-9 Score: 7 (12/5/2023  5:11 PM)  RL-7 Total Score: 7 (12/5/2023  5:10 PM)      Interventions Provided: CBT, motivational interviewing      Progress Made: Minimum    Response to Intervention:   Pt reports lacking consistency and motivation and knows that she needs to get back to routines.  Explored pt compliance with her lexapro, which patient states that she forgets to take on the weekends when with her boyfriend.  Discussed the importance of taking the medication daily, using a pill box, and taking it during a time when she is active, such as prior to showering after work.  Pt resistant to pill box as it makes her feel depressed she has to take medications, however after looking at 'cute' pillboxes, pt was more willing to try.    Discussed importance of patient motivating herself to take showers daily and complete hygiene tasks without her mom or boyfriend having to remind her.      Plan:     Purchase a pill box and find a time in routine to take medication daily  Re-establish routine for coming home after work      Follow Up / Next Appointment: Next appointment: 12/19/23

## 2023-12-18 ENCOUNTER — APPOINTMENT (OUTPATIENT)
Dept: PRIMARY CARE | Facility: CLINIC | Age: 23
End: 2023-12-18
Payer: COMMERCIAL

## 2023-12-28 ENCOUNTER — APPOINTMENT (OUTPATIENT)
Dept: PRIMARY CARE | Facility: CLINIC | Age: 23
End: 2023-12-28
Payer: COMMERCIAL

## 2023-12-29 ENCOUNTER — DOCUMENTATION (OUTPATIENT)
Dept: PRIMARY CARE | Facility: CLINIC | Age: 23
End: 2023-12-29
Payer: COMMERCIAL

## 2023-12-29 DIAGNOSIS — F41.8 MIXED ANXIETY AND DEPRESSIVE DISORDER: Primary | ICD-10-CM

## 2023-12-29 PROCEDURE — 99493 SBSQ PSYC COLLAB CARE MGMT: CPT | Performed by: FAMILY MEDICINE

## 2024-01-02 ENCOUNTER — SOCIAL WORK (OUTPATIENT)
Dept: PRIMARY CARE | Facility: CLINIC | Age: 24
End: 2024-01-02
Payer: COMMERCIAL

## 2024-01-02 DIAGNOSIS — F41.8 MIXED ANXIETY AND DEPRESSIVE DISORDER: Primary | ICD-10-CM

## 2024-01-02 ASSESSMENT — ANXIETY QUESTIONNAIRES
2. NOT BEING ABLE TO STOP OR CONTROL WORRYING: SEVERAL DAYS
6. BECOMING EASILY ANNOYED OR IRRITABLE: NOT AT ALL
5. BEING SO RESTLESS THAT IT IS HARD TO SIT STILL: NOT AT ALL
1. FEELING NERVOUS, ANXIOUS, OR ON EDGE: SEVERAL DAYS
3. WORRYING TOO MUCH ABOUT DIFFERENT THINGS: SEVERAL DAYS
GAD7 TOTAL SCORE: 4
4. TROUBLE RELAXING: NOT AT ALL
7. FEELING AFRAID AS IF SOMETHING AWFUL MIGHT HAPPEN: SEVERAL DAYS
IF YOU CHECKED OFF ANY PROBLEMS ON THIS QUESTIONNAIRE, HOW DIFFICULT HAVE THESE PROBLEMS MADE IT FOR YOU TO DO YOUR WORK, TAKE CARE OF THINGS AT HOME, OR GET ALONG WITH OTHER PEOPLE: NOT DIFFICULT AT ALL

## 2024-01-02 ASSESSMENT — PATIENT HEALTH QUESTIONNAIRE - PHQ9
6. FEELING BAD ABOUT YOURSELF - OR THAT YOU ARE A FAILURE OR HAVE LET YOURSELF OR YOUR FAMILY DOWN: NOT AT ALL
1. LITTLE INTEREST OR PLEASURE IN DOING THINGS: NOT AT ALL
4. FEELING TIRED OR HAVING LITTLE ENERGY: SEVERAL DAYS
7. TROUBLE CONCENTRATING ON THINGS, SUCH AS READING THE NEWSPAPER OR WATCHING TELEVISION: NOT AT ALL
10. IF YOU CHECKED OFF ANY PROBLEMS, HOW DIFFICULT HAVE THESE PROBLEMS MADE IT FOR YOU TO DO YOUR WORK, TAKE CARE OF THINGS AT HOME, OR GET ALONG WITH OTHER PEOPLE: NOT DIFFICULT AT ALL
SUM OF ALL RESPONSES TO PHQ9 QUESTIONS 1 & 2: 0
9. THOUGHTS THAT YOU WOULD BE BETTER OFF DEAD, OR OF HURTING YOURSELF: NOT AT ALL
2. FEELING DOWN, DEPRESSED OR HOPELESS: NOT AT ALL
8. MOVING OR SPEAKING SO SLOWLY THAT OTHER PEOPLE COULD HAVE NOTICED. OR THE OPPOSITE, BEING SO FIGETY OR RESTLESS THAT YOU HAVE BEEN MOVING AROUND A LOT MORE THAN USUAL: NOT AT ALL
SUM OF ALL RESPONSES TO PHQ QUESTIONS 1-9: 2
3. TROUBLE FALLING OR STAYING ASLEEP: NOT AT ALL
5. POOR APPETITE OR OVEREATING: SEVERAL DAYS

## 2024-01-05 DIAGNOSIS — I49.3 FREQUENT PVCS: ICD-10-CM

## 2024-01-09 RX ORDER — METOPROLOL SUCCINATE 50 MG/1
50 TABLET, EXTENDED RELEASE ORAL DAILY
Qty: 90 TABLET | Refills: 1 | Status: SHIPPED | OUTPATIENT
Start: 2024-01-09 | End: 2024-05-20

## 2024-01-16 ENCOUNTER — SOCIAL WORK (OUTPATIENT)
Dept: PRIMARY CARE | Facility: CLINIC | Age: 24
End: 2024-01-16
Payer: COMMERCIAL

## 2024-01-16 DIAGNOSIS — F41.8 MIXED ANXIETY AND DEPRESSIVE DISORDER: Primary | ICD-10-CM

## 2024-01-16 ASSESSMENT — PATIENT HEALTH QUESTIONNAIRE - PHQ9
SUM OF ALL RESPONSES TO PHQ9 QUESTIONS 1 & 2: 0
10. IF YOU CHECKED OFF ANY PROBLEMS, HOW DIFFICULT HAVE THESE PROBLEMS MADE IT FOR YOU TO DO YOUR WORK, TAKE CARE OF THINGS AT HOME, OR GET ALONG WITH OTHER PEOPLE: NOT DIFFICULT AT ALL
5. POOR APPETITE OR OVEREATING: NOT AT ALL
3. TROUBLE FALLING OR STAYING ASLEEP: SEVERAL DAYS
2. FEELING DOWN, DEPRESSED OR HOPELESS: NOT AT ALL
7. TROUBLE CONCENTRATING ON THINGS, SUCH AS READING THE NEWSPAPER OR WATCHING TELEVISION: NOT AT ALL
9. THOUGHTS THAT YOU WOULD BE BETTER OFF DEAD, OR OF HURTING YOURSELF: NOT AT ALL
8. MOVING OR SPEAKING SO SLOWLY THAT OTHER PEOPLE COULD HAVE NOTICED. OR THE OPPOSITE, BEING SO FIGETY OR RESTLESS THAT YOU HAVE BEEN MOVING AROUND A LOT MORE THAN USUAL: NOT AT ALL
6. FEELING BAD ABOUT YOURSELF - OR THAT YOU ARE A FAILURE OR HAVE LET YOURSELF OR YOUR FAMILY DOWN: NOT AT ALL
SUM OF ALL RESPONSES TO PHQ QUESTIONS 1-9: 2
1. LITTLE INTEREST OR PLEASURE IN DOING THINGS: NOT AT ALL
4. FEELING TIRED OR HAVING LITTLE ENERGY: SEVERAL DAYS

## 2024-01-16 ASSESSMENT — ANXIETY QUESTIONNAIRES
5. BEING SO RESTLESS THAT IT IS HARD TO SIT STILL: NOT AT ALL
6. BECOMING EASILY ANNOYED OR IRRITABLE: NOT AT ALL
1. FEELING NERVOUS, ANXIOUS, OR ON EDGE: NOT AT ALL
7. FEELING AFRAID AS IF SOMETHING AWFUL MIGHT HAPPEN: SEVERAL DAYS
4. TROUBLE RELAXING: NOT AT ALL
3. WORRYING TOO MUCH ABOUT DIFFERENT THINGS: NOT AT ALL
GAD7 TOTAL SCORE: 1
IF YOU CHECKED OFF ANY PROBLEMS ON THIS QUESTIONNAIRE, HOW DIFFICULT HAVE THESE PROBLEMS MADE IT FOR YOU TO DO YOUR WORK, TAKE CARE OF THINGS AT HOME, OR GET ALONG WITH OTHER PEOPLE: NOT DIFFICULT AT ALL
2. NOT BEING ABLE TO STOP OR CONTROL WORRYING: NOT AT ALL

## 2024-01-16 NOTE — PROGRESS NOTES
Collaborative Care (CoCM)  Progress Note    Type of Interaction: In Office    Start Time: 5:15 PM    End Time: 6:10 PM    Appointment: Scheduled    Reason for Visit:   Chief Complaint   Patient presents with    Follow-up       Interval History / Patient Symptoms:   Subjective   Samantha Sunshine is a 24 y.o. female who presents for follow up for Collaborative Care services.     Patient Health Questionnaire-9 Score: 2 (1/16/2024  5:16 PM)  RL-7 Total Score: 1 (1/16/2024  5:16 PM)    Interventions Provided: CBT    Progress Made: Moderate    Response to Intervention:  Waking more often lately- constantly goes up and down  Eating better- salads for lunch with lots of vegetables- makes her feel better about herself when eating healthy  How big is your beef?- discussed using as a way to determine if making things bigger than they need to be  Not judging self as much- less negative self talk    Plan:   Continue to utilize routines and different dietary routines   Plan ahead with wisdom teeth surgery- planning meals to stay on current routine    Follow Up / Next Appointment: Next appointment: 02/06/24

## 2024-01-22 ENCOUNTER — ANESTHESIA EVENT (OUTPATIENT)
Dept: OPERATING ROOM | Facility: CLINIC | Age: 24
End: 2024-01-22

## 2024-01-25 ENCOUNTER — ANESTHESIA (OUTPATIENT)
Dept: OPERATING ROOM | Facility: CLINIC | Age: 24
End: 2024-01-25
Payer: COMMERCIAL

## 2024-01-28 NOTE — PROGRESS NOTES
"Collaborative Care (CoCM)  Progress Note    Type of Interaction: In Office    Start Time: 5:10 PM    End Time: 6:00 PM    Appointment: Scheduled    Reason for Visit:   Chief Complaint   Patient presents with    Follow-up      Interval History / Patient Symptoms:   Subjective   Samantha Sunshine is a 23 y.o. female who presents for follow up for Collaborative Care services. Current symptoms have been more controlled over the past few weeks.  Patient denies SI/HI and panic attacks.   General: alert and oriented, in no acute distress  Affect and Behavior: good insight, normal speech pattern and content, and normal thought patterns     Patient Health Questionnaire-9 Score: 2 (1/2/2024  5:15 PM)  RL-7 Total Score: 4 (1/2/2024  5:14 PM)     PHQ score is decreased from previous session.   RL score is decreased from previous session.    Interventions Provided: CBT, motivational Interviewing    Progress Made: Slight    Response to Intervention:   We discussed the following elements from the last appointment: Pt reports she has been keeping busy; knowing when needing to be alone; self aware on \"what I need and not what makes others happy\"     We addressed the following components during appointment: Discussed how to continue with feeling motivated- and positive changes that patient has made.  Also discussed pts motivation for weight loss, and the effort she plans on putting into losing weight; pt reports her weight makes her feel depressed.        Plan:   Follow up in 2 weeks  Continue with routine  Identify different dietary routines that are possibilities for pt.    Follow Up / Next Appointment: Next appointment: 01/16/24    "
Pt c/o palpitations x Friday. Pt seen and d/c'd Friday for same complaint, cardioversion on Thursday. Pt recently started on eliquis. C/o WISE, mild chest discomfort. EKG in progress.

## 2024-01-30 ENCOUNTER — APPOINTMENT (OUTPATIENT)
Dept: PRIMARY CARE | Facility: CLINIC | Age: 24
End: 2024-01-30
Payer: COMMERCIAL

## 2024-01-30 ENCOUNTER — DOCUMENTATION (OUTPATIENT)
Dept: PRIMARY CARE | Facility: CLINIC | Age: 24
End: 2024-01-30

## 2024-01-30 DIAGNOSIS — F41.8 MIXED ANXIETY AND DEPRESSIVE DISORDER: Primary | ICD-10-CM

## 2024-01-30 PROCEDURE — 99494 1ST/SBSQ PSYC COLLAB CARE: CPT | Performed by: FAMILY MEDICINE

## 2024-01-30 PROCEDURE — 99493 SBSQ PSYC COLLAB CARE MGMT: CPT | Performed by: FAMILY MEDICINE

## 2024-02-01 ENCOUNTER — OFFICE VISIT (OUTPATIENT)
Dept: PRIMARY CARE | Facility: CLINIC | Age: 24
End: 2024-02-01
Payer: COMMERCIAL

## 2024-02-01 VITALS
HEIGHT: 63 IN | SYSTOLIC BLOOD PRESSURE: 132 MMHG | OXYGEN SATURATION: 97 % | HEART RATE: 81 BPM | BODY MASS INDEX: 51.38 KG/M2 | DIASTOLIC BLOOD PRESSURE: 86 MMHG | WEIGHT: 290 LBS

## 2024-02-01 DIAGNOSIS — F41.8 MIXED ANXIETY AND DEPRESSIVE DISORDER: Primary | ICD-10-CM

## 2024-02-01 PROCEDURE — 1036F TOBACCO NON-USER: CPT | Performed by: FAMILY MEDICINE

## 2024-02-01 PROCEDURE — 3008F BODY MASS INDEX DOCD: CPT | Performed by: FAMILY MEDICINE

## 2024-02-01 PROCEDURE — 99214 OFFICE O/P EST MOD 30 MIN: CPT | Performed by: FAMILY MEDICINE

## 2024-02-01 RX ORDER — HYDROXYZINE PAMOATE 25 MG/1
25 CAPSULE ORAL 3 TIMES DAILY PRN
Qty: 30 CAPSULE | Refills: 0 | Status: SHIPPED | OUTPATIENT
Start: 2024-02-01 | End: 2024-02-11

## 2024-02-01 RX ORDER — VILAZODONE HYDROCHLORIDE 10 MG/1
TABLET ORAL
Qty: 40 TABLET | Refills: 0 | Status: SHIPPED | OUTPATIENT
Start: 2024-02-01 | End: 2024-03-06 | Stop reason: SDUPTHER

## 2024-02-01 ASSESSMENT — PATIENT HEALTH QUESTIONNAIRE - PHQ9
SUM OF ALL RESPONSES TO PHQ QUESTIONS 1-9: 9
3. TROUBLE FALLING OR STAYING ASLEEP OR SLEEPING TOO MUCH: NEARLY EVERY DAY
2. FEELING DOWN, DEPRESSED OR HOPELESS: SEVERAL DAYS
SUM OF ALL RESPONSES TO PHQ9 QUESTIONS 1 AND 2: 2
4. FEELING TIRED OR HAVING LITTLE ENERGY: SEVERAL DAYS
7. TROUBLE CONCENTRATING ON THINGS, SUCH AS READING THE NEWSPAPER OR WATCHING TELEVISION: NOT AT ALL
1. LITTLE INTEREST OR PLEASURE IN DOING THINGS: SEVERAL DAYS
6. FEELING BAD ABOUT YOURSELF - OR THAT YOU ARE A FAILURE OR HAVE LET YOURSELF OR YOUR FAMILY DOWN: MORE THAN HALF THE DAYS
9. THOUGHTS THAT YOU WOULD BE BETTER OFF DEAD, OR OF HURTING YOURSELF: NOT AT ALL
8. MOVING OR SPEAKING SO SLOWLY THAT OTHER PEOPLE COULD HAVE NOTICED. OR THE OPPOSITE, BEING SO FIGETY OR RESTLESS THAT YOU HAVE BEEN MOVING AROUND A LOT MORE THAN USUAL: NOT AT ALL
10. IF YOU CHECKED OFF ANY PROBLEMS, HOW DIFFICULT HAVE THESE PROBLEMS MADE IT FOR YOU TO DO YOUR WORK, TAKE CARE OF THINGS AT HOME, OR GET ALONG WITH OTHER PEOPLE: VERY DIFFICULT
5. POOR APPETITE OR OVEREATING: SEVERAL DAYS

## 2024-02-01 ASSESSMENT — ANXIETY QUESTIONNAIRES
4. TROUBLE RELAXING: MORE THAN HALF THE DAYS
2. NOT BEING ABLE TO STOP OR CONTROL WORRYING: MORE THAN HALF THE DAYS
6. BECOMING EASILY ANNOYED OR IRRITABLE: MORE THAN HALF THE DAYS
GAD7 TOTAL SCORE: 12
5. BEING SO RESTLESS THAT IT IS HARD TO SIT STILL: NOT AT ALL
1. FEELING NERVOUS, ANXIOUS, OR ON EDGE: NEARLY EVERY DAY
7. FEELING AFRAID AS IF SOMETHING AWFUL MIGHT HAPPEN: SEVERAL DAYS
3. WORRYING TOO MUCH ABOUT DIFFERENT THINGS: MORE THAN HALF THE DAYS
IF YOU CHECKED OFF ANY PROBLEMS ON THIS QUESTIONNAIRE, HOW DIFFICULT HAVE THESE PROBLEMS MADE IT FOR YOU TO DO YOUR WORK, TAKE CARE OF THINGS AT HOME, OR GET ALONG WITH OTHER PEOPLE: VERY DIFFICULT

## 2024-02-01 NOTE — PROGRESS NOTES
"Subjective   Patient ID: Samantha Sunshine is a 24 y.o. female who presents for Med Refill.    HPI  Accompanied by her mom today with the following concerns:    ANXIETY & DEPRESSION:   Followed by our Collaborative Care Team for the last several months.  Last visit 1/30/2024    Has been tolerating Escitalopram 40 mg but symptoms worsening  For the last week or 2 mom noticing more tearful and unable to sleep  They think her anxiety was triggered when her  wisdom tooth extraction was denied by insurance.  The oral surgeon wanted her to have procedure in hospital OR due to history of cardiac arrhythmia but insurance denied.    Currently on Escitalopram 40 mg  Failed Sertraline initially - helped with anxiety but felt like no motivation, felt too flat  Wellbutrin increased her BP     Concerns with continued weight gain as well     INSOMNIA: tossing and turning at night     Review of Systems    Review of Systems negative except as noted in HPI and Chief complaint.     Objective     Patient Health Questionnaire-9 Score: 9     RL-7 Total Score: 12       /86 (BP Location: Right arm, Patient Position: Sitting, BP Cuff Size: Large adult)   Pulse 81   Ht 1.6 m (5' 3\")   Wt 132 kg (290 lb)   SpO2 97%   BMI 51.37 kg/m²      Physical Exam  Constitutional:       General: She is not in acute distress.     Appearance: Normal appearance. She is not ill-appearing.   HENT:      Head: Normocephalic and atraumatic.   Neck:      Vascular: No carotid bruit.   Cardiovascular:      Rate and Rhythm: Normal rate and regular rhythm.      Pulses: Normal pulses.      Heart sounds: Normal heart sounds. No murmur heard.     No gallop.   Pulmonary:      Effort: Pulmonary effort is normal.      Breath sounds: Normal breath sounds. No wheezing, rhonchi or rales.   Musculoskeletal:      Cervical back: Normal range of motion and neck supple. No rigidity or tenderness.   Lymphadenopathy:      Cervical: No cervical adenopathy.   Skin:     General: " Skin is warm and dry.   Neurological:      Mental Status: She is alert.   Psychiatric:         Mood and Affect: Mood normal.         Behavior: Behavior normal.         Assessment/Plan   Problem List Items Addressed This Visit       Mixed anxiety and depressive disorder - Primary     Weaning off Escitalopram to  Vilazodone (Vibryd)  Encouraging follow up with  Behavioral Health Collaborative Care Team  Call if symptoms worsen.  Follow up 4-6 weeks.         Relevant Medications    hydrOXYzine pamoate (VistariL) 25 mg capsule    vilazodone (Viibryd) 10 mg tablet       Time Spent  Prep time on day of patient encounter: 5 minutes  Time spent directly with patient, family or caregiver: 20 minutes  Additional Time Spent on Patient Care Activities: 0 minutes  Documentation Time: 20 minutes  Other Time Spent: 0 minutes  Total: 45 minutes

## 2024-02-01 NOTE — PATIENT INSTRUCTIONS
WEEK 1: Alternate Lexapro 20 mg with Viibryd 10 mg  WEEK 2: Take Viibyd 10 mg daily  WEEK 3: Take 20 mg Viibryd daily

## 2024-02-02 NOTE — ASSESSMENT & PLAN NOTE
Weaning off Escitalopram to  Vilazodone (Vibryd)  Encouraging follow up with  Behavioral Health Collaborative Care Team  Call if symptoms worsen.  Follow up 4-6 weeks.

## 2024-02-06 ENCOUNTER — SOCIAL WORK (OUTPATIENT)
Dept: PRIMARY CARE | Facility: CLINIC | Age: 24
End: 2024-02-06
Payer: COMMERCIAL

## 2024-02-06 DIAGNOSIS — F41.8 MIXED ANXIETY AND DEPRESSIVE DISORDER: Primary | ICD-10-CM

## 2024-02-06 ASSESSMENT — ANXIETY QUESTIONNAIRES
2. NOT BEING ABLE TO STOP OR CONTROL WORRYING: SEVERAL DAYS
1. FEELING NERVOUS, ANXIOUS, OR ON EDGE: SEVERAL DAYS
6. BECOMING EASILY ANNOYED OR IRRITABLE: SEVERAL DAYS
3. WORRYING TOO MUCH ABOUT DIFFERENT THINGS: SEVERAL DAYS
IF YOU CHECKED OFF ANY PROBLEMS ON THIS QUESTIONNAIRE, HOW DIFFICULT HAVE THESE PROBLEMS MADE IT FOR YOU TO DO YOUR WORK, TAKE CARE OF THINGS AT HOME, OR GET ALONG WITH OTHER PEOPLE: SOMEWHAT DIFFICULT
GAD7 TOTAL SCORE: 5
7. FEELING AFRAID AS IF SOMETHING AWFUL MIGHT HAPPEN: SEVERAL DAYS
4. TROUBLE RELAXING: NOT AT ALL
5. BEING SO RESTLESS THAT IT IS HARD TO SIT STILL: NOT AT ALL

## 2024-02-06 ASSESSMENT — PATIENT HEALTH QUESTIONNAIRE - PHQ9
5. POOR APPETITE OR OVEREATING: NOT AT ALL
1. LITTLE INTEREST OR PLEASURE IN DOING THINGS: SEVERAL DAYS
10. IF YOU CHECKED OFF ANY PROBLEMS, HOW DIFFICULT HAVE THESE PROBLEMS MADE IT FOR YOU TO DO YOUR WORK, TAKE CARE OF THINGS AT HOME, OR GET ALONG WITH OTHER PEOPLE: SOMEWHAT DIFFICULT
4. FEELING TIRED OR HAVING LITTLE ENERGY: SEVERAL DAYS
3. TROUBLE FALLING OR STAYING ASLEEP: MORE THAN HALF THE DAYS
SUM OF ALL RESPONSES TO PHQ9 QUESTIONS 1 & 2: 2
7. TROUBLE CONCENTRATING ON THINGS, SUCH AS READING THE NEWSPAPER OR WATCHING TELEVISION: NOT AT ALL
6. FEELING BAD ABOUT YOURSELF - OR THAT YOU ARE A FAILURE OR HAVE LET YOURSELF OR YOUR FAMILY DOWN: SEVERAL DAYS
8. MOVING OR SPEAKING SO SLOWLY THAT OTHER PEOPLE COULD HAVE NOTICED. OR THE OPPOSITE, BEING SO FIGETY OR RESTLESS THAT YOU HAVE BEEN MOVING AROUND A LOT MORE THAN USUAL: NOT AT ALL
9. THOUGHTS THAT YOU WOULD BE BETTER OFF DEAD, OR OF HURTING YOURSELF: NOT AT ALL
2. FEELING DOWN, DEPRESSED OR HOPELESS: SEVERAL DAYS
SUM OF ALL RESPONSES TO PHQ QUESTIONS 1-9: 6

## 2024-02-06 NOTE — PROGRESS NOTES
Collaborative Care ([facility])  Progress Note    Type of Interaction: In Office    Start Time: 5:10 PM    End Time: 6:00 PM    Appointment: Scheduled    Reason for Visit:   Chief Complaint   Patient presents with    Follow-up      Interval History / Patient Symptoms:   Subjective   Samantha Sunshine is a 24 y.o. female who presents for follow up for Collaborative Care services.     Patient Health Questionnaire-9 Score: 6 (2/6/2024  5:22 PM)  RL-7 Total Score: 5 (2/6/2024  5:18 PM)     Interventions Provided: Problem Solving Treatment    Progress Made: Slight    Response to Intervention:   We discussed the following elements from the last appointment:    Triggered anxiety from not being able to get wisdom teeth extracted; States that she was able to catch the anxiety quicker than previous times- came on quickly and lasted about 1 week.  Did see PCP and stopped Lexapro and started Viibryd and Hydroxyzine (for sleep) and states that she has been doing well on the new med and feels good.     Reviewed goals and routines of patient- identified ways to continue to utilize routines as well as continuing to eat healthy.  Pt was able to self reflect and process the anxiety she had after she was told she couldn't get her wisdom teeth removed.  Pt reports feeling proud that she was able to identify the anxiety and tell her mom quickly, which helped resolve the anxiety quicker.  Pt reports that she has been using the beef sheet for identifying how big a problem is.    Plan:   Follow up with [facility] as scheduled.    Follow Up / Next Appointment:

## 2024-02-13 ENCOUNTER — APPOINTMENT (OUTPATIENT)
Dept: PRIMARY CARE | Facility: CLINIC | Age: 24
End: 2024-02-13
Payer: COMMERCIAL

## 2024-02-16 DIAGNOSIS — K21.9 GASTRO-ESOPHAGEAL REFLUX DISEASE WITHOUT ESOPHAGITIS: ICD-10-CM

## 2024-02-16 RX ORDER — OMEPRAZOLE 20 MG/1
CAPSULE, DELAYED RELEASE ORAL
Qty: 180 CAPSULE | Refills: 2 | Status: SHIPPED | OUTPATIENT
Start: 2024-02-16

## 2024-02-27 ENCOUNTER — APPOINTMENT (OUTPATIENT)
Dept: PRIMARY CARE | Facility: CLINIC | Age: 24
End: 2024-02-27
Payer: COMMERCIAL

## 2024-02-29 ENCOUNTER — DOCUMENTATION (OUTPATIENT)
Dept: PRIMARY CARE | Facility: CLINIC | Age: 24
End: 2024-02-29

## 2024-02-29 DIAGNOSIS — F41.8 MIXED ANXIETY AND DEPRESSIVE DISORDER: Primary | ICD-10-CM

## 2024-02-29 PROCEDURE — 99493 SBSQ PSYC COLLAB CARE MGMT: CPT | Performed by: FAMILY MEDICINE

## 2024-03-04 DIAGNOSIS — F41.8 MIXED ANXIETY AND DEPRESSIVE DISORDER: ICD-10-CM

## 2024-03-06 ENCOUNTER — TELEPHONE (OUTPATIENT)
Dept: PRIMARY CARE | Facility: CLINIC | Age: 24
End: 2024-03-06
Payer: COMMERCIAL

## 2024-03-06 RX ORDER — VILAZODONE HYDROCHLORIDE 10 MG/1
20 TABLET ORAL
Qty: 40 TABLET | Refills: 0 | Status: SHIPPED | OUTPATIENT
Start: 2024-03-06 | End: 2024-03-12 | Stop reason: DRUGHIGH

## 2024-03-12 ENCOUNTER — OFFICE VISIT (OUTPATIENT)
Dept: PRIMARY CARE | Facility: CLINIC | Age: 24
End: 2024-03-12
Payer: COMMERCIAL

## 2024-03-12 ENCOUNTER — SOCIAL WORK (OUTPATIENT)
Dept: PRIMARY CARE | Facility: CLINIC | Age: 24
End: 2024-03-12
Payer: COMMERCIAL

## 2024-03-12 VITALS — HEART RATE: 103 BPM | SYSTOLIC BLOOD PRESSURE: 120 MMHG | OXYGEN SATURATION: 93 % | DIASTOLIC BLOOD PRESSURE: 80 MMHG

## 2024-03-12 DIAGNOSIS — F41.8 MIXED ANXIETY AND DEPRESSIVE DISORDER: ICD-10-CM

## 2024-03-12 DIAGNOSIS — F41.8 MIXED ANXIETY AND DEPRESSIVE DISORDER: Primary | ICD-10-CM

## 2024-03-12 PROCEDURE — 1036F TOBACCO NON-USER: CPT | Performed by: FAMILY MEDICINE

## 2024-03-12 PROCEDURE — 99213 OFFICE O/P EST LOW 20 MIN: CPT | Performed by: FAMILY MEDICINE

## 2024-03-12 PROCEDURE — 3008F BODY MASS INDEX DOCD: CPT | Performed by: FAMILY MEDICINE

## 2024-03-12 RX ORDER — BUSPIRONE HYDROCHLORIDE 10 MG/1
10 TABLET ORAL 3 TIMES DAILY
Qty: 45 TABLET | Refills: 0 | Status: SHIPPED | OUTPATIENT
Start: 2024-03-12 | End: 2024-03-19

## 2024-03-12 RX ORDER — VILAZODONE HYDROCHLORIDE 40 MG/1
40 TABLET ORAL
Qty: 90 TABLET | Refills: 0 | Status: SHIPPED | OUTPATIENT
Start: 2024-03-12

## 2024-03-12 RX ORDER — VILAZODONE HYDROCHLORIDE 20 MG/1
20 TABLET ORAL
Qty: 90 TABLET | Refills: 1 | Status: SHIPPED | OUTPATIENT
Start: 2024-03-12 | End: 2024-03-12 | Stop reason: SDUPTHER

## 2024-03-12 ASSESSMENT — PATIENT HEALTH QUESTIONNAIRE - PHQ9
2. FEELING DOWN, DEPRESSED OR HOPELESS: SEVERAL DAYS
9. THOUGHTS THAT YOU WOULD BE BETTER OFF DEAD, OR OF HURTING YOURSELF: NOT AT ALL
6. FEELING BAD ABOUT YOURSELF - OR THAT YOU ARE A FAILURE OR HAVE LET YOURSELF OR YOUR FAMILY DOWN: NOT AT ALL
9. THOUGHTS THAT YOU WOULD BE BETTER OFF DEAD, OR OF HURTING YOURSELF: NOT AT ALL
1. LITTLE INTEREST OR PLEASURE IN DOING THINGS: NOT AT ALL
5. POOR APPETITE OR OVEREATING: NOT AT ALL
10. IF YOU CHECKED OFF ANY PROBLEMS, HOW DIFFICULT HAVE THESE PROBLEMS MADE IT FOR YOU TO DO YOUR WORK, TAKE CARE OF THINGS AT HOME, OR GET ALONG WITH OTHER PEOPLE: NOT DIFFICULT AT ALL
5. POOR APPETITE OR OVEREATING: NOT AT ALL
SUM OF ALL RESPONSES TO PHQ9 QUESTIONS 1 & 2: 1
7. TROUBLE CONCENTRATING ON THINGS, SUCH AS READING THE NEWSPAPER OR WATCHING TELEVISION: NOT AT ALL
8. MOVING OR SPEAKING SO SLOWLY THAT OTHER PEOPLE COULD HAVE NOTICED. OR THE OPPOSITE, BEING SO FIGETY OR RESTLESS THAT YOU HAVE BEEN MOVING AROUND A LOT MORE THAN USUAL: NOT AT ALL
6. FEELING BAD ABOUT YOURSELF - OR THAT YOU ARE A FAILURE OR HAVE LET YOURSELF OR YOUR FAMILY DOWN: NOT AT ALL
SUM OF ALL RESPONSES TO PHQ9 QUESTIONS 1 AND 2: 1
10. IF YOU CHECKED OFF ANY PROBLEMS, HOW DIFFICULT HAVE THESE PROBLEMS MADE IT FOR YOU TO DO YOUR WORK, TAKE CARE OF THINGS AT HOME, OR GET ALONG WITH OTHER PEOPLE: NOT DIFFICULT AT ALL
SUM OF ALL RESPONSES TO PHQ QUESTIONS 1-9: 2
7. TROUBLE CONCENTRATING ON THINGS, SUCH AS READING THE NEWSPAPER OR WATCHING TELEVISION: NOT AT ALL
4. FEELING TIRED OR HAVING LITTLE ENERGY: SEVERAL DAYS
1. LITTLE INTEREST OR PLEASURE IN DOING THINGS: NOT AT ALL
4. FEELING TIRED OR HAVING LITTLE ENERGY: SEVERAL DAYS
2. FEELING DOWN, DEPRESSED OR HOPELESS: SEVERAL DAYS
3. TROUBLE FALLING OR STAYING ASLEEP: NOT AT ALL
SUM OF ALL RESPONSES TO PHQ QUESTIONS 1-9: 2
3. TROUBLE FALLING OR STAYING ASLEEP OR SLEEPING TOO MUCH: NOT AT ALL
8. MOVING OR SPEAKING SO SLOWLY THAT OTHER PEOPLE COULD HAVE NOTICED. OR THE OPPOSITE, BEING SO FIGETY OR RESTLESS THAT YOU HAVE BEEN MOVING AROUND A LOT MORE THAN USUAL: NOT AT ALL

## 2024-03-12 ASSESSMENT — ANXIETY QUESTIONNAIRES
4. TROUBLE RELAXING: NOT AT ALL
7. FEELING AFRAID AS IF SOMETHING AWFUL MIGHT HAPPEN: SEVERAL DAYS
IF YOU CHECKED OFF ANY PROBLEMS ON THIS QUESTIONNAIRE, HOW DIFFICULT HAVE THESE PROBLEMS MADE IT FOR YOU TO DO YOUR WORK, TAKE CARE OF THINGS AT HOME, OR GET ALONG WITH OTHER PEOPLE: VERY DIFFICULT
5. BEING SO RESTLESS THAT IT IS HARD TO SIT STILL: NOT AT ALL
6. BECOMING EASILY ANNOYED OR IRRITABLE: SEVERAL DAYS
IF YOU CHECKED OFF ANY PROBLEMS ON THIS QUESTIONNAIRE, HOW DIFFICULT HAVE THESE PROBLEMS MADE IT FOR YOU TO DO YOUR WORK, TAKE CARE OF THINGS AT HOME, OR GET ALONG WITH OTHER PEOPLE: VERY DIFFICULT
3. WORRYING TOO MUCH ABOUT DIFFERENT THINGS: SEVERAL DAYS
2. NOT BEING ABLE TO STOP OR CONTROL WORRYING: SEVERAL DAYS
2. NOT BEING ABLE TO STOP OR CONTROL WORRYING: SEVERAL DAYS
GAD7 TOTAL SCORE: 5
1. FEELING NERVOUS, ANXIOUS, OR ON EDGE: SEVERAL DAYS
3. WORRYING TOO MUCH ABOUT DIFFERENT THINGS: SEVERAL DAYS
5. BEING SO RESTLESS THAT IT IS HARD TO SIT STILL: NOT AT ALL
1. FEELING NERVOUS, ANXIOUS, OR ON EDGE: SEVERAL DAYS
6. BECOMING EASILY ANNOYED OR IRRITABLE: SEVERAL DAYS
7. FEELING AFRAID AS IF SOMETHING AWFUL MIGHT HAPPEN: SEVERAL DAYS
4. TROUBLE RELAXING: NOT AT ALL
GAD7 TOTAL SCORE: 5

## 2024-03-12 NOTE — ASSESSMENT & PLAN NOTE
Increasing Vilazodone to 40 mg daily.  Suggest addition of Buspirone 1-3 times daily.  Reviewed risks, side effects and expected treatment course of medications.  Call with any problems or concerns.

## 2024-03-12 NOTE — PROGRESS NOTES
Subjective   Patient ID: Samantha Sunshine is a 24 y.o. female who presents for Follow-up (Still very upset, crying and emotional, she feels as if the medication has not helped at all. ).    HPI    ANXIETY & DEPRESSION:  Patient Health Questionnaire-9 Score: 2  RL-7 Total Score: 5    Still irritable the last few days and more teary and crying over small things  More irritable as well    2 weeks ago tearfulness was worse but easier to stop the tears now    Has been 4 weeks on Vilazodone  Diarrhea and stomach issues initially - did vomit once  Changed  her diet a little less sugar and seemed to resolve    Did have a sinus infection and given Zofran from [facility]    In review: Wellbutrin increased BP  Escitalopram did not help with symptoms much  She continues to work with our Behavioral Health Collaborative Care Team.    INSOMNIA: sleeping better - waking up without difficulty  In bed by 10 pm up at 7 am    Using Hydroxyzine every few days for sleep and helping significantly but makes her tired the next day.    Review of Systems    Review of Systems negative except as noted in HPI and Chief complaint.     Objective     Patient Health Questionnaire-9 Score: 2     RL-7 Total Score: 5       /80 (BP Location: Left arm, Patient Position: Sitting, BP Cuff Size: Adult)   Pulse 103   SpO2 93%      Physical Exam  Constitutional:       General: She is not in acute distress.     Appearance: Normal appearance. She is not ill-appearing.   HENT:      Head: Normocephalic and atraumatic.   Neck:      Vascular: No carotid bruit.   Cardiovascular:      Rate and Rhythm: Normal rate and regular rhythm.      Pulses: Normal pulses.      Heart sounds: Normal heart sounds. No murmur heard.     No gallop.   Pulmonary:      Effort: Pulmonary effort is normal.      Breath sounds: Normal breath sounds. No wheezing, rhonchi or rales.   Musculoskeletal:      Cervical back: Normal range of motion and neck supple. No rigidity or tenderness.    Lymphadenopathy:      Cervical: No cervical adenopathy.   Skin:     General: Skin is warm and dry.   Neurological:      Mental Status: She is alert.   Psychiatric:         Behavior: Behavior normal.      Comments: Tearful with discussion of stressors and daily activities.  Good eye contact and appropriate responses, smiling with discussion.         Assessment/Plan   Problem List Items Addressed This Visit       Mixed anxiety and depressive disorder     Increasing Vilazodone to 40 mg daily.  Suggest addition of Buspirone 1-3 times daily.  Reviewed risks, side effects and expected treatment course of medications.  Call with any problems or concerns.          Relevant Medications    busPIRone (Buspar) 10 mg tablet    vilazodone (Viibryd) 40 mg tablet     FOLLOW UP 3 MONTHS

## 2024-03-12 NOTE — PROGRESS NOTES
"Collaborative Care (CoCM)  Progress Note    Type of Interaction: In Office    Start Time: 8:35 AM    End Time: 9:15 AM    Appointment: Scheduled    Reason for Visit:   Chief Complaint   Patient presents with    Follow-up      Interval History / Patient Symptoms:   Subjective   Samantha Sunshine is a 24 y.o. female who presents for follow up for Collaborative Care services.  Pt reports feeling more emotional and teary since switching to viibryd; not necessarily depressed, but more anxious and irritable.    Patient Health Questionnaire-9 Score: 2 (3/12/2024  9:32 AM)  RL-7 Total Score: 5 (3/12/2024  8:36 AM)       Interventions Provided: CBT    Progress Made: Slight    Response to Intervention:   We discussed the following elements from the last appointment: Pt states she feels \"Like a rock stuck in the wheel\" - having a hard time moving past this most recent block.  Still irritable- worse in the last several weeks; making a whole cow out of things without even trying to; emotions taking over including crying or anger/irritability; self fulfilling prophecy- thinks she is going to be emotional and then does become emotional  Pt states that she has been doing better with eating habits- Resisting urges/temptations, has lost weight     Plan:   Continue to resist urges/impulses in regards to dietary choices- doing well with this  Positive self talk-   Will wait to do any changes/new plan until after PCP appt (today) and re-assess at next appointment    Follow Up / Next Appointment: Next appointment: 03/26/24   "

## 2024-03-19 DIAGNOSIS — F41.8 MIXED ANXIETY AND DEPRESSIVE DISORDER: ICD-10-CM

## 2024-03-19 RX ORDER — BUSPIRONE HYDROCHLORIDE 10 MG/1
10 TABLET ORAL 3 TIMES DAILY
Qty: 270 TABLET | Refills: 1 | Status: SHIPPED | OUTPATIENT
Start: 2024-03-19

## 2024-03-25 ENCOUNTER — APPOINTMENT (OUTPATIENT)
Dept: OBSTETRICS AND GYNECOLOGY | Facility: CLINIC | Age: 24
End: 2024-03-25
Payer: COMMERCIAL

## 2024-03-26 ENCOUNTER — APPOINTMENT (OUTPATIENT)
Dept: PRIMARY CARE | Facility: CLINIC | Age: 24
End: 2024-03-26
Payer: COMMERCIAL

## 2024-03-29 ENCOUNTER — DOCUMENTATION (OUTPATIENT)
Dept: PRIMARY CARE | Facility: CLINIC | Age: 24
End: 2024-03-29
Payer: COMMERCIAL

## 2024-03-29 DIAGNOSIS — F41.8 MIXED ANXIETY AND DEPRESSIVE DISORDER: Primary | ICD-10-CM

## 2024-03-29 PROCEDURE — 99493 SBSQ PSYC COLLAB CARE MGMT: CPT | Performed by: FAMILY MEDICINE

## 2024-04-01 ENCOUNTER — SOCIAL WORK (OUTPATIENT)
Dept: PRIMARY CARE | Facility: CLINIC | Age: 24
End: 2024-04-01
Payer: COMMERCIAL

## 2024-04-01 DIAGNOSIS — F41.8 MIXED ANXIETY AND DEPRESSIVE DISORDER: Primary | ICD-10-CM

## 2024-04-01 ASSESSMENT — ANXIETY QUESTIONNAIRES
6. BECOMING EASILY ANNOYED OR IRRITABLE: SEVERAL DAYS
7. FEELING AFRAID AS IF SOMETHING AWFUL MIGHT HAPPEN: NOT AT ALL
2. NOT BEING ABLE TO STOP OR CONTROL WORRYING: NOT AT ALL
5. BEING SO RESTLESS THAT IT IS HARD TO SIT STILL: NOT AT ALL
4. TROUBLE RELAXING: NOT AT ALL
3. WORRYING TOO MUCH ABOUT DIFFERENT THINGS: NOT AT ALL
1. FEELING NERVOUS, ANXIOUS, OR ON EDGE: NOT AT ALL
GAD7 TOTAL SCORE: 1
IF YOU CHECKED OFF ANY PROBLEMS ON THIS QUESTIONNAIRE, HOW DIFFICULT HAVE THESE PROBLEMS MADE IT FOR YOU TO DO YOUR WORK, TAKE CARE OF THINGS AT HOME, OR GET ALONG WITH OTHER PEOPLE: NOT DIFFICULT AT ALL

## 2024-04-01 ASSESSMENT — PATIENT HEALTH QUESTIONNAIRE - PHQ9
7. TROUBLE CONCENTRATING ON THINGS, SUCH AS READING THE NEWSPAPER OR WATCHING TELEVISION: NOT AT ALL
9. THOUGHTS THAT YOU WOULD BE BETTER OFF DEAD, OR OF HURTING YOURSELF: NOT AT ALL
4. FEELING TIRED OR HAVING LITTLE ENERGY: NOT AT ALL
2. FEELING DOWN, DEPRESSED OR HOPELESS: NOT AT ALL
8. MOVING OR SPEAKING SO SLOWLY THAT OTHER PEOPLE COULD HAVE NOTICED. OR THE OPPOSITE, BEING SO FIGETY OR RESTLESS THAT YOU HAVE BEEN MOVING AROUND A LOT MORE THAN USUAL: NOT AT ALL
10. IF YOU CHECKED OFF ANY PROBLEMS, HOW DIFFICULT HAVE THESE PROBLEMS MADE IT FOR YOU TO DO YOUR WORK, TAKE CARE OF THINGS AT HOME, OR GET ALONG WITH OTHER PEOPLE: NOT DIFFICULT AT ALL
SUM OF ALL RESPONSES TO PHQ QUESTIONS 1-9: 1
1. LITTLE INTEREST OR PLEASURE IN DOING THINGS: NOT AT ALL
6. FEELING BAD ABOUT YOURSELF - OR THAT YOU ARE A FAILURE OR HAVE LET YOURSELF OR YOUR FAMILY DOWN: NOT AT ALL
5. POOR APPETITE OR OVEREATING: SEVERAL DAYS
3. TROUBLE FALLING OR STAYING ASLEEP: NOT AT ALL
SUM OF ALL RESPONSES TO PHQ9 QUESTIONS 1 & 2: 0

## 2024-04-01 NOTE — PROGRESS NOTES
"Collaborative Care (CoCM)  Progress Note    Type of Interaction: Virtual    Start Time: 12:00 PM    End Time: 12:50 PM    Appointment: Scheduled    Reason for Visit:   Chief Complaint   Patient presents with    Follow-up        Interval History / Patient Symptoms:   Subjective   Samantha Sunshine is a 24 y.o. female who presents for follow up for Collaborative Care services.     Patient Health Questionnaire-9 Score: 1 (4/1/2024 12:42 PM)  RL-7 Total Score: 1 (4/1/2024 12:38 PM)       Interventions Provided: Behavioral Activation and Motivational Interviewing    Progress Made: Some    Response to Intervention:   We discussed the following elements from the last appointment:   Dietary choices- better other than yesterday; difficult at boyfriend's house- going to focus on it this week  Positive self talk- hasn't been calling self \"crazy\"- been catching self and correcting.   Medication- bumped up viibryd to 40mg and added buspar- Takes 5mg. 1x/morning, sometimes in the afternoon- PRN; 10mg makes jittery; helps energy levels and brings to baseline  Viibryd- way better \"I have energy\"; walking, more motivated and organized- doing so much more than usual. Can keep moving throughout the day and really tired at night  Still some mood swings- and holding onto anger  Irritability isn't going to go away; what can be done to 'leave it at the door'; could talk about it to someone/letting it out  Able to share when feeling high anxiety  Plan:   Anger scale- re-evaluate what is bothering me in the moment- goal is to work on checking hte scale when irritated, overwhelmed or something 'off'    Follow Up / Next Appointment: Next appointment: 04/15/24   "

## 2024-04-15 ENCOUNTER — APPOINTMENT (OUTPATIENT)
Dept: PRIMARY CARE | Facility: CLINIC | Age: 24
End: 2024-04-15
Payer: COMMERCIAL

## 2024-04-19 ENCOUNTER — APPOINTMENT (OUTPATIENT)
Dept: AUDIOLOGY | Facility: CLINIC | Age: 24
End: 2024-04-19
Payer: COMMERCIAL

## 2024-04-22 ENCOUNTER — APPOINTMENT (OUTPATIENT)
Dept: AUDIOLOGY | Facility: CLINIC | Age: 24
End: 2024-04-22
Payer: COMMERCIAL

## 2024-04-24 ENCOUNTER — APPOINTMENT (OUTPATIENT)
Dept: OTOLARYNGOLOGY | Facility: CLINIC | Age: 24
End: 2024-04-24
Payer: COMMERCIAL

## 2024-04-29 ENCOUNTER — SOCIAL WORK (OUTPATIENT)
Dept: PRIMARY CARE | Facility: CLINIC | Age: 24
End: 2024-04-29
Payer: COMMERCIAL

## 2024-04-29 DIAGNOSIS — F41.8 MIXED ANXIETY AND DEPRESSIVE DISORDER: Primary | ICD-10-CM

## 2024-04-29 ASSESSMENT — ANXIETY QUESTIONNAIRES
5. BEING SO RESTLESS THAT IT IS HARD TO SIT STILL: NOT AT ALL
1. FEELING NERVOUS, ANXIOUS, OR ON EDGE: SEVERAL DAYS
GAD7 TOTAL SCORE: 3
6. BECOMING EASILY ANNOYED OR IRRITABLE: SEVERAL DAYS
IF YOU CHECKED OFF ANY PROBLEMS ON THIS QUESTIONNAIRE, HOW DIFFICULT HAVE THESE PROBLEMS MADE IT FOR YOU TO DO YOUR WORK, TAKE CARE OF THINGS AT HOME, OR GET ALONG WITH OTHER PEOPLE: SOMEWHAT DIFFICULT
4. TROUBLE RELAXING: NOT AT ALL
2. NOT BEING ABLE TO STOP OR CONTROL WORRYING: SEVERAL DAYS
7. FEELING AFRAID AS IF SOMETHING AWFUL MIGHT HAPPEN: NOT AT ALL
3. WORRYING TOO MUCH ABOUT DIFFERENT THINGS: NOT AT ALL

## 2024-04-29 NOTE — PROGRESS NOTES
Collaborative Care ([facility])  Progress Note    Type of Interaction: Virtual    Start Time: 12:05 PM    End Time: 12:50 PM    Appointment: Scheduled    Reason for Visit:   Chief Complaint   Patient presents with    Follow-up     Interval History / Patient Symptoms:   Subjective   Samantha Sunshine is a 24 y.o. female who presents for follow up for Collaborative Care services.     Patient Health Questionnaire-9 Score: 0 (4/29/2024 12:07 PM)  RL-7 Total Score: 3 (4/29/2024 12:09 PM)    Interventions Provided: CBT    Progress Made: Slight    Response to Intervention:  We discussed the following elements from the last appointment:    Pt currently struggling with her relationship with her boyfriend; feels she gives in the relationship but doesn't always get/considered by him.  Discussed whether at the end of the weekend when being with him fills her 'bucket' or empties it.  Discussed bucket fillers and bucket dippers as a metaphor for how fulfilled she is in certain relationships.  Pt acknowledged that her 'bucket' tends to be empty and she is often questioning why she is feeling upset.  Will often blame herself for her emotions/reactions.  Pt shared that she doesn't always know where to start and it is hard to put into words what she is feeling.  Talked about journaling her thoughts/feelings before talking to him to organize her thoughts.      Plan:   Continue to use scale to determine reactions; think about whether writing down thoughts would be helpful before talking to boyfriend or for any situation where there is extra emotion.    Follow Up / Next Appointment: Next appointment: 05/13/24

## 2024-04-30 ENCOUNTER — DOCUMENTATION (OUTPATIENT)
Dept: PRIMARY CARE | Facility: CLINIC | Age: 24
End: 2024-04-30
Payer: COMMERCIAL

## 2024-04-30 DIAGNOSIS — F41.8 MIXED ANXIETY AND DEPRESSIVE DISORDER: Primary | ICD-10-CM

## 2024-04-30 PROCEDURE — 99494 1ST/SBSQ PSYC COLLAB CARE: CPT | Performed by: FAMILY MEDICINE

## 2024-04-30 PROCEDURE — 99493 SBSQ PSYC COLLAB CARE MGMT: CPT | Performed by: FAMILY MEDICINE

## 2024-05-04 DIAGNOSIS — I49.3 FREQUENT PVCS: ICD-10-CM

## 2024-05-04 DIAGNOSIS — E28.2 POLYCYSTIC OVARIAN SYNDROME: ICD-10-CM

## 2024-05-13 ENCOUNTER — SOCIAL WORK (OUTPATIENT)
Dept: PRIMARY CARE | Facility: CLINIC | Age: 24
End: 2024-05-13
Payer: COMMERCIAL

## 2024-05-13 DIAGNOSIS — F41.8 MIXED ANXIETY AND DEPRESSIVE DISORDER: Primary | ICD-10-CM

## 2024-05-13 ASSESSMENT — ANXIETY QUESTIONNAIRES
3. WORRYING TOO MUCH ABOUT DIFFERENT THINGS: NOT AT ALL
IF YOU CHECKED OFF ANY PROBLEMS ON THIS QUESTIONNAIRE, HOW DIFFICULT HAVE THESE PROBLEMS MADE IT FOR YOU TO DO YOUR WORK, TAKE CARE OF THINGS AT HOME, OR GET ALONG WITH OTHER PEOPLE: NOT DIFFICULT AT ALL
GAD7 TOTAL SCORE: 3
2. NOT BEING ABLE TO STOP OR CONTROL WORRYING: SEVERAL DAYS
7. FEELING AFRAID AS IF SOMETHING AWFUL MIGHT HAPPEN: NOT AT ALL
1. FEELING NERVOUS, ANXIOUS, OR ON EDGE: SEVERAL DAYS
4. TROUBLE RELAXING: NOT AT ALL
5. BEING SO RESTLESS THAT IT IS HARD TO SIT STILL: NOT AT ALL
6. BECOMING EASILY ANNOYED OR IRRITABLE: SEVERAL DAYS

## 2024-05-13 ASSESSMENT — PATIENT HEALTH QUESTIONNAIRE - PHQ9
4. FEELING TIRED OR HAVING LITTLE ENERGY: NOT AT ALL
SUM OF ALL RESPONSES TO PHQ QUESTIONS 1-9: 2
6. FEELING BAD ABOUT YOURSELF - OR THAT YOU ARE A FAILURE OR HAVE LET YOURSELF OR YOUR FAMILY DOWN: NOT AT ALL
7. TROUBLE CONCENTRATING ON THINGS, SUCH AS READING THE NEWSPAPER OR WATCHING TELEVISION: NOT AT ALL
2. FEELING DOWN, DEPRESSED OR HOPELESS: NOT AT ALL
1. LITTLE INTEREST OR PLEASURE IN DOING THINGS: NOT AT ALL
8. MOVING OR SPEAKING SO SLOWLY THAT OTHER PEOPLE COULD HAVE NOTICED. OR THE OPPOSITE, BEING SO FIGETY OR RESTLESS THAT YOU HAVE BEEN MOVING AROUND A LOT MORE THAN USUAL: NOT AT ALL
10. IF YOU CHECKED OFF ANY PROBLEMS, HOW DIFFICULT HAVE THESE PROBLEMS MADE IT FOR YOU TO DO YOUR WORK, TAKE CARE OF THINGS AT HOME, OR GET ALONG WITH OTHER PEOPLE: NOT DIFFICULT AT ALL
SUM OF ALL RESPONSES TO PHQ9 QUESTIONS 1 & 2: 0
9. THOUGHTS THAT YOU WOULD BE BETTER OFF DEAD, OR OF HURTING YOURSELF: NOT AT ALL
3. TROUBLE FALLING OR STAYING ASLEEP: SEVERAL DAYS
5. POOR APPETITE OR OVEREATING: SEVERAL DAYS

## 2024-05-13 NOTE — PROGRESS NOTES
Collaborative Care (CoC)  Progress Note    Type of Interaction: In Office    Start Time: 4:10 PM    End Time: 5:00 PM    Appointment: Scheduled    Reason for Visit:   Chief Complaint   Patient presents with    Follow-up      Interval History / Patient Symptoms:   Subjective   Samantha Sunshine is a 24 y.o. female who presents for follow up for Collaborative Care services.     Patient Health Questionnaire-9 Score: 2 (5/13/2024  4:11 PM)  RL-7 Total Score: 3 (5/13/2024  4:11 PM)     Interventions Provided: Feeling/Social emotional identification    Progress Made: Moderate    Response to Intervention:  We discussed the following elements from the last appointment: Pt reports that she was able to talk to her boyfriend about her feelings (discussed at last appointment) and it went well; has noticed some change in her boyfriend's responses to her.  Pt identified that she felt more confident in her ability to talk to her boyfriend.  Reviewed chapter 13 of the Anxiety and Phobia workbook- dealing with feelings. Went over the bullet points of feelings and processed how these show up in patient's life.    Plan:   Between appointments, patient wants to work on:  Stop and think more about why feeling the way I'm feeling- good in some aspects but need to work on it with anger/irritability. Also discussed identifying the positive feelings, too.  Feeling wheel- utilize to narrow down feelings in order to better deal with them    Follow Up / Next Appointment: Next appointment: 05/28/24

## 2024-05-20 RX ORDER — METFORMIN HYDROCHLORIDE 500 MG/1
500 TABLET ORAL 2 TIMES DAILY
Qty: 60 TABLET | Refills: 0 | Status: SHIPPED | OUTPATIENT
Start: 2024-05-20 | End: 2024-06-05

## 2024-05-20 RX ORDER — METOPROLOL SUCCINATE 50 MG/1
50 TABLET, EXTENDED RELEASE ORAL DAILY
Qty: 30 TABLET | Refills: 0 | Status: SHIPPED | OUTPATIENT
Start: 2024-05-20

## 2024-05-28 ENCOUNTER — PATIENT MESSAGE (OUTPATIENT)
Dept: PRIMARY CARE | Facility: CLINIC | Age: 24
End: 2024-05-28

## 2024-05-28 ENCOUNTER — APPOINTMENT (OUTPATIENT)
Dept: PRIMARY CARE | Facility: CLINIC | Age: 24
End: 2024-05-28
Payer: COMMERCIAL

## 2024-05-28 DIAGNOSIS — I49.3 FREQUENT PVCS: ICD-10-CM

## 2024-05-31 ENCOUNTER — DOCUMENTATION (OUTPATIENT)
Dept: PRIMARY CARE | Facility: CLINIC | Age: 24
End: 2024-05-31
Payer: COMMERCIAL

## 2024-05-31 DIAGNOSIS — F41.8 MIXED ANXIETY AND DEPRESSIVE DISORDER: Primary | ICD-10-CM

## 2024-05-31 PROCEDURE — 99493 SBSQ PSYC COLLAB CARE MGMT: CPT | Performed by: FAMILY MEDICINE

## 2024-06-03 DIAGNOSIS — I49.3 FREQUENT PVCS: ICD-10-CM

## 2024-06-03 DIAGNOSIS — E28.2 POLYCYSTIC OVARIAN SYNDROME: ICD-10-CM

## 2024-06-05 RX ORDER — METOPROLOL SUCCINATE 50 MG/1
50 TABLET, EXTENDED RELEASE ORAL DAILY
Qty: 90 TABLET | Refills: 1 | OUTPATIENT
Start: 2024-06-05

## 2024-06-05 RX ORDER — METFORMIN HYDROCHLORIDE 500 MG/1
500 TABLET ORAL 2 TIMES DAILY
Qty: 60 TABLET | Refills: 0 | Status: SHIPPED | OUTPATIENT
Start: 2024-06-05

## 2024-06-12 ENCOUNTER — APPOINTMENT (OUTPATIENT)
Dept: PRIMARY CARE | Facility: CLINIC | Age: 24
End: 2024-06-12
Payer: COMMERCIAL

## 2024-06-12 DIAGNOSIS — F41.8 MIXED ANXIETY AND DEPRESSIVE DISORDER: Primary | ICD-10-CM

## 2024-06-12 ASSESSMENT — ANXIETY QUESTIONNAIRES
1. FEELING NERVOUS, ANXIOUS, OR ON EDGE: SEVERAL DAYS
GAD7 TOTAL SCORE: 3
6. BECOMING EASILY ANNOYED OR IRRITABLE: SEVERAL DAYS
4. TROUBLE RELAXING: NOT AT ALL
2. NOT BEING ABLE TO STOP OR CONTROL WORRYING: SEVERAL DAYS
5. BEING SO RESTLESS THAT IT IS HARD TO SIT STILL: NOT AT ALL
7. FEELING AFRAID AS IF SOMETHING AWFUL MIGHT HAPPEN: NOT AT ALL
IF YOU CHECKED OFF ANY PROBLEMS ON THIS QUESTIONNAIRE, HOW DIFFICULT HAVE THESE PROBLEMS MADE IT FOR YOU TO DO YOUR WORK, TAKE CARE OF THINGS AT HOME, OR GET ALONG WITH OTHER PEOPLE: NOT DIFFICULT AT ALL
3. WORRYING TOO MUCH ABOUT DIFFERENT THINGS: NOT AT ALL

## 2024-06-12 ASSESSMENT — PATIENT HEALTH QUESTIONNAIRE - PHQ9
6. FEELING BAD ABOUT YOURSELF - OR THAT YOU ARE A FAILURE OR HAVE LET YOURSELF OR YOUR FAMILY DOWN: SEVERAL DAYS
2. FEELING DOWN, DEPRESSED OR HOPELESS: NOT AT ALL
9. THOUGHTS THAT YOU WOULD BE BETTER OFF DEAD, OR OF HURTING YOURSELF: NOT AT ALL
SUM OF ALL RESPONSES TO PHQ9 QUESTIONS 1 & 2: 0
7. TROUBLE CONCENTRATING ON THINGS, SUCH AS READING THE NEWSPAPER OR WATCHING TELEVISION: NOT AT ALL
10. IF YOU CHECKED OFF ANY PROBLEMS, HOW DIFFICULT HAVE THESE PROBLEMS MADE IT FOR YOU TO DO YOUR WORK, TAKE CARE OF THINGS AT HOME, OR GET ALONG WITH OTHER PEOPLE: NOT DIFFICULT AT ALL
8. MOVING OR SPEAKING SO SLOWLY THAT OTHER PEOPLE COULD HAVE NOTICED. OR THE OPPOSITE, BEING SO FIGETY OR RESTLESS THAT YOU HAVE BEEN MOVING AROUND A LOT MORE THAN USUAL: NOT AT ALL
SUM OF ALL RESPONSES TO PHQ QUESTIONS 1-9: 3
5. POOR APPETITE OR OVEREATING: SEVERAL DAYS
3. TROUBLE FALLING OR STAYING ASLEEP: NOT AT ALL
4. FEELING TIRED OR HAVING LITTLE ENERGY: SEVERAL DAYS
1. LITTLE INTEREST OR PLEASURE IN DOING THINGS: NOT AT ALL

## 2024-06-12 NOTE — PROGRESS NOTES
Collaborative Care (CoCM)  Progress Note    Type of Interaction: In Office    Start Time: 4:00 PM    End Time: 5:00 PM    Appointment: Scheduled    Reason for Visit:   Chief Complaint   Patient presents with    Follow-up     Interval History / Patient Symptoms:   Subjective   Samantha Sunshine is a 24 y.o. female who presents for follow up for Collaborative Care services.     Patient Health Questionnaire-9 Score: 3 (6/12/2024  4:02 PM)  RL-7 Total Score: 3 (6/12/2024  4:01 PM)     Interventions Provided: CBT    Progress Made: Slight    Response to Intervention:  We discussed the following elements from the last appointment:   Reviewed feelings; discussed feeling wheel  Visualize feelings in body- watermelon with skin peeled off  Overall doing well- annoyance last night dealing with an umpire at the softball game who was not treating her well- identified walking away and talking to her boyfriend to help cope with it.  Has lost weight- had to buy new pants because her others are too big  Continues to use the hamburger chart and otehrs at worka re also using it    Plan:   Continue with progress made- utilizing skills discussed in appointments such as visualizing feelings    Follow Up / Next Appointment: Next appointment: 06/26/24

## 2024-06-18 ENCOUNTER — PATIENT MESSAGE (OUTPATIENT)
Dept: PRIMARY CARE | Facility: CLINIC | Age: 24
End: 2024-06-18
Payer: COMMERCIAL

## 2024-06-18 DIAGNOSIS — F41.8 MIXED ANXIETY AND DEPRESSIVE DISORDER: ICD-10-CM

## 2024-06-18 DIAGNOSIS — N92.1 MENORRHAGIA WITH IRREGULAR CYCLE: ICD-10-CM

## 2024-06-18 RX ORDER — NORETHINDRONE ACETATE AND ETHINYL ESTRADIOL 1MG-20(21)
1 KIT ORAL DAILY
Qty: 84 TABLET | Refills: 0 | Status: SHIPPED | OUTPATIENT
Start: 2024-06-18

## 2024-06-18 RX ORDER — VILAZODONE HYDROCHLORIDE 40 MG/1
40 TABLET ORAL
Qty: 30 TABLET | Refills: 0 | Status: SHIPPED | OUTPATIENT
Start: 2024-06-18

## 2024-06-26 ENCOUNTER — APPOINTMENT (OUTPATIENT)
Dept: PRIMARY CARE | Facility: CLINIC | Age: 24
End: 2024-06-26
Payer: COMMERCIAL

## 2024-06-28 ENCOUNTER — DOCUMENTATION (OUTPATIENT)
Dept: PRIMARY CARE | Facility: CLINIC | Age: 24
End: 2024-06-28
Payer: COMMERCIAL

## 2024-06-28 DIAGNOSIS — F41.8 MIXED ANXIETY AND DEPRESSIVE DISORDER: Primary | ICD-10-CM

## 2024-07-09 ENCOUNTER — APPOINTMENT (OUTPATIENT)
Dept: PRIMARY CARE | Facility: CLINIC | Age: 24
End: 2024-07-09
Payer: COMMERCIAL

## 2024-07-18 DIAGNOSIS — F41.8 MIXED ANXIETY AND DEPRESSIVE DISORDER: ICD-10-CM

## 2024-07-19 RX ORDER — VILAZODONE HYDROCHLORIDE 40 MG/1
40 TABLET ORAL
Qty: 30 TABLET | Refills: 0 | Status: SHIPPED | OUTPATIENT
Start: 2024-07-19

## 2024-07-24 ENCOUNTER — APPOINTMENT (OUTPATIENT)
Dept: PRIMARY CARE | Facility: CLINIC | Age: 24
End: 2024-07-24
Payer: COMMERCIAL

## 2024-07-26 ENCOUNTER — APPOINTMENT (OUTPATIENT)
Dept: PRIMARY CARE | Facility: CLINIC | Age: 24
End: 2024-07-26
Payer: COMMERCIAL

## 2024-07-28 ENCOUNTER — PATIENT MESSAGE (OUTPATIENT)
Dept: PRIMARY CARE | Facility: CLINIC | Age: 24
End: 2024-07-28
Payer: COMMERCIAL

## 2024-07-28 DIAGNOSIS — T75.3XXA MOTION SICKNESS, INITIAL ENCOUNTER: Primary | ICD-10-CM

## 2024-07-30 RX ORDER — SCOLOPAMINE TRANSDERMAL SYSTEM 1 MG/1
1 PATCH, EXTENDED RELEASE TRANSDERMAL
Qty: 4 PATCH | Refills: 0 | Status: SHIPPED | OUTPATIENT
Start: 2024-07-30 | End: 2024-09-28

## 2024-08-06 ENCOUNTER — APPOINTMENT (OUTPATIENT)
Dept: PRIMARY CARE | Facility: CLINIC | Age: 24
End: 2024-08-06
Payer: COMMERCIAL

## 2024-08-06 DIAGNOSIS — F41.8 MIXED ANXIETY AND DEPRESSIVE DISORDER: Primary | ICD-10-CM

## 2024-08-06 ASSESSMENT — PATIENT HEALTH QUESTIONNAIRE - PHQ9
SUM OF ALL RESPONSES TO PHQ QUESTIONS 1-9: 2
SUM OF ALL RESPONSES TO PHQ9 QUESTIONS 1 & 2: 0
1. LITTLE INTEREST OR PLEASURE IN DOING THINGS: NOT AT ALL
6. FEELING BAD ABOUT YOURSELF - OR THAT YOU ARE A FAILURE OR HAVE LET YOURSELF OR YOUR FAMILY DOWN: NOT AT ALL
7. TROUBLE CONCENTRATING ON THINGS, SUCH AS READING THE NEWSPAPER OR WATCHING TELEVISION: NOT AT ALL
10. IF YOU CHECKED OFF ANY PROBLEMS, HOW DIFFICULT HAVE THESE PROBLEMS MADE IT FOR YOU TO DO YOUR WORK, TAKE CARE OF THINGS AT HOME, OR GET ALONG WITH OTHER PEOPLE: NOT DIFFICULT AT ALL
5. POOR APPETITE OR OVEREATING: SEVERAL DAYS
9. THOUGHTS THAT YOU WOULD BE BETTER OFF DEAD, OR OF HURTING YOURSELF: NOT AT ALL
8. MOVING OR SPEAKING SO SLOWLY THAT OTHER PEOPLE COULD HAVE NOTICED. OR THE OPPOSITE, BEING SO FIGETY OR RESTLESS THAT YOU HAVE BEEN MOVING AROUND A LOT MORE THAN USUAL: NOT AT ALL
2. FEELING DOWN, DEPRESSED OR HOPELESS: NOT AT ALL
3. TROUBLE FALLING OR STAYING ASLEEP: SEVERAL DAYS
4. FEELING TIRED OR HAVING LITTLE ENERGY: NOT AT ALL

## 2024-08-06 ASSESSMENT — ANXIETY QUESTIONNAIRES
5. BEING SO RESTLESS THAT IT IS HARD TO SIT STILL: NOT AT ALL
1. FEELING NERVOUS, ANXIOUS, OR ON EDGE: SEVERAL DAYS
6. BECOMING EASILY ANNOYED OR IRRITABLE: SEVERAL DAYS
4. TROUBLE RELAXING: NOT AT ALL
2. NOT BEING ABLE TO STOP OR CONTROL WORRYING: NOT AT ALL
GAD7 TOTAL SCORE: 2
IF YOU CHECKED OFF ANY PROBLEMS ON THIS QUESTIONNAIRE, HOW DIFFICULT HAVE THESE PROBLEMS MADE IT FOR YOU TO DO YOUR WORK, TAKE CARE OF THINGS AT HOME, OR GET ALONG WITH OTHER PEOPLE: NOT DIFFICULT AT ALL
3. WORRYING TOO MUCH ABOUT DIFFERENT THINGS: NOT AT ALL
7. FEELING AFRAID AS IF SOMETHING AWFUL MIGHT HAPPEN: NOT AT ALL

## 2024-08-06 NOTE — PROGRESS NOTES
Collaborative Care (CoCM)  Progress Note    Type of Interaction: In Office    Start Time: 1:05 PM    End Time: 1:58 PM    Appointment: Scheduled    Reason for Visit:   Chief Complaint   Patient presents with    Follow-up      Interval History / Patient Symptoms:   Subjective   Samantha Sunshine is a 24 y.o. female who presents for follow up for Collaborative Care services. C    Patient Health Questionnaire-9 Score: 2 (8/6/2024  1:11 PM)  RL-7 Total Score: 2 (8/6/2024  1:10 PM)     Interventions Provided: Behavioral Activation and Motivational Interviewing    Progress Made: Moderate    Response to Intervention:  We discussed the following elements from the last appointment:   Overall patient doing very well; went on vacation with her boyfriend to the beach and enjoyed her time; discussed ways that she has grown over the past year, including becoming more independent from her parents.  Patient denies any concerns with medication at this time. Pt reports work has been stressful because her boss is not allowing her to leave for appointments that she requested time off.  Pt has also no-showed a PCP appt recently and reports it was due to not being able to leave work.  Discussed importance of taking care of her health needs, attending appointments as scheduled, and consequences of not following through with dr recommendations.  Patient stated she has to schedule a few dr follow ups soon.      Plan:   Pt to follow up with [facility] and PCP in 1 month; at that time, if stable, patient will be placed in relapse prevention with anticipation of transitioning out of collaborative care.    Follow Up / Next Appointment: Next appointment: 09/03/24

## 2024-08-16 ENCOUNTER — PATIENT MESSAGE (OUTPATIENT)
Dept: PRIMARY CARE | Facility: CLINIC | Age: 24
End: 2024-08-16
Payer: COMMERCIAL

## 2024-08-16 DIAGNOSIS — F41.8 MIXED ANXIETY AND DEPRESSIVE DISORDER: ICD-10-CM

## 2024-08-20 RX ORDER — VILAZODONE HYDROCHLORIDE 40 MG/1
40 TABLET ORAL
Qty: 30 TABLET | Refills: 0 | Status: SHIPPED | OUTPATIENT
Start: 2024-08-20

## 2024-08-30 ENCOUNTER — DOCUMENTATION (OUTPATIENT)
Dept: PRIMARY CARE | Facility: CLINIC | Age: 24
End: 2024-08-30
Payer: COMMERCIAL

## 2024-08-30 DIAGNOSIS — F41.8 MIXED ANXIETY AND DEPRESSIVE DISORDER: Primary | ICD-10-CM

## 2024-09-03 ENCOUNTER — APPOINTMENT (OUTPATIENT)
Dept: PRIMARY CARE | Facility: CLINIC | Age: 24
End: 2024-09-03
Payer: COMMERCIAL

## 2024-09-03 ENCOUNTER — SOCIAL WORK (OUTPATIENT)
Dept: PRIMARY CARE | Facility: CLINIC | Age: 24
End: 2024-09-03
Payer: COMMERCIAL

## 2024-09-03 ENCOUNTER — LAB (OUTPATIENT)
Dept: LAB | Facility: LAB | Age: 24
End: 2024-09-03
Payer: COMMERCIAL

## 2024-09-03 VITALS
BODY MASS INDEX: 47.13 KG/M2 | DIASTOLIC BLOOD PRESSURE: 78 MMHG | SYSTOLIC BLOOD PRESSURE: 124 MMHG | HEIGHT: 63 IN | WEIGHT: 266 LBS

## 2024-09-03 DIAGNOSIS — E55.9 VITAMIN D DEFICIENCY: ICD-10-CM

## 2024-09-03 DIAGNOSIS — H69.93 DYSFUNCTION OF BOTH EUSTACHIAN TUBES: ICD-10-CM

## 2024-09-03 DIAGNOSIS — Z00.00 ANNUAL PHYSICAL EXAM: ICD-10-CM

## 2024-09-03 DIAGNOSIS — F41.8 MIXED ANXIETY AND DEPRESSIVE DISORDER: Primary | ICD-10-CM

## 2024-09-03 DIAGNOSIS — N92.1 MENORRHAGIA WITH IRREGULAR CYCLE: ICD-10-CM

## 2024-09-03 PROBLEM — I49.3 FREQUENT PVCS: Status: RESOLVED | Noted: 2023-06-29 | Resolved: 2024-09-03

## 2024-09-03 LAB
25(OH)D3 SERPL-MCNC: 31 NG/ML (ref 30–100)
ALBUMIN SERPL BCP-MCNC: 4.2 G/DL (ref 3.4–5)
ALP SERPL-CCNC: 49 U/L (ref 33–110)
ALT SERPL W P-5'-P-CCNC: 15 U/L (ref 7–45)
ANION GAP SERPL CALC-SCNC: 12 MMOL/L (ref 10–20)
AST SERPL W P-5'-P-CCNC: 13 U/L (ref 9–39)
BILIRUB SERPL-MCNC: 0.3 MG/DL (ref 0–1.2)
BUN SERPL-MCNC: 9 MG/DL (ref 6–23)
CALCIUM SERPL-MCNC: 9.6 MG/DL (ref 8.6–10.6)
CHLORIDE SERPL-SCNC: 104 MMOL/L (ref 98–107)
CHOLEST SERPL-MCNC: 184 MG/DL (ref 0–199)
CHOLESTEROL/HDL RATIO: 3
CO2 SERPL-SCNC: 28 MMOL/L (ref 21–32)
CREAT SERPL-MCNC: 0.65 MG/DL (ref 0.5–1.05)
EGFRCR SERPLBLD CKD-EPI 2021: >90 ML/MIN/1.73M*2
ERYTHROCYTE [DISTWIDTH] IN BLOOD BY AUTOMATED COUNT: 12.3 % (ref 11.5–14.5)
GLUCOSE SERPL-MCNC: 85 MG/DL (ref 74–99)
HCT VFR BLD AUTO: 42.8 % (ref 36–46)
HDLC SERPL-MCNC: 61.2 MG/DL
HGB BLD-MCNC: 13 G/DL (ref 12–16)
LDLC SERPL CALC-MCNC: 104 MG/DL
MCH RBC QN AUTO: 25.9 PG (ref 26–34)
MCHC RBC AUTO-ENTMCNC: 30.4 G/DL (ref 32–36)
MCV RBC AUTO: 85 FL (ref 80–100)
NON HDL CHOLESTEROL: 123 MG/DL (ref 0–149)
NRBC BLD-RTO: 0 /100 WBCS (ref 0–0)
PLATELET # BLD AUTO: 295 X10*3/UL (ref 150–450)
POTASSIUM SERPL-SCNC: 4.4 MMOL/L (ref 3.5–5.3)
PROT SERPL-MCNC: 6.8 G/DL (ref 6.4–8.2)
RBC # BLD AUTO: 5.01 X10*6/UL (ref 4–5.2)
SODIUM SERPL-SCNC: 140 MMOL/L (ref 136–145)
TRIGL SERPL-MCNC: 95 MG/DL (ref 0–149)
TSH SERPL-ACNC: 1.72 MIU/L (ref 0.44–3.98)
VLDL: 19 MG/DL (ref 0–40)
WBC # BLD AUTO: 8.6 X10*3/UL (ref 4.4–11.3)

## 2024-09-03 PROCEDURE — 80053 COMPREHEN METABOLIC PANEL: CPT

## 2024-09-03 PROCEDURE — 80061 LIPID PANEL: CPT

## 2024-09-03 PROCEDURE — 36415 COLL VENOUS BLD VENIPUNCTURE: CPT

## 2024-09-03 PROCEDURE — 82306 VITAMIN D 25 HYDROXY: CPT

## 2024-09-03 PROCEDURE — 84443 ASSAY THYROID STIM HORMONE: CPT

## 2024-09-03 PROCEDURE — 85027 COMPLETE CBC AUTOMATED: CPT

## 2024-09-03 RX ORDER — VILAZODONE HYDROCHLORIDE 40 MG/1
40 TABLET ORAL
Qty: 90 TABLET | Refills: 3 | Status: SHIPPED | OUTPATIENT
Start: 2024-09-03

## 2024-09-03 RX ORDER — NORETHINDRONE ACETATE AND ETHINYL ESTRADIOL 1MG-20(21)
1 KIT ORAL DAILY
Qty: 84 TABLET | Refills: 0 | Status: SHIPPED | OUTPATIENT
Start: 2024-09-03

## 2024-09-03 RX ORDER — AZELASTINE HYDROCHLORIDE, FLUTICASONE PROPIONATE 137; 50 UG/1; UG/1
1 SPRAY, METERED NASAL 2 TIMES DAILY
Qty: 1 EACH | Refills: 5 | Status: SHIPPED | OUTPATIENT
Start: 2024-09-03

## 2024-09-03 RX ORDER — BUSPIRONE HYDROCHLORIDE 10 MG/1
5 TABLET ORAL 3 TIMES DAILY
Qty: 270 TABLET | Refills: 1 | Status: SHIPPED | OUTPATIENT
Start: 2024-09-03

## 2024-09-03 ASSESSMENT — ANXIETY QUESTIONNAIRES
6. BECOMING EASILY ANNOYED OR IRRITABLE: NOT AT ALL
7. FEELING AFRAID AS IF SOMETHING AWFUL MIGHT HAPPEN: NOT AT ALL
4. TROUBLE RELAXING: NOT AT ALL
GAD7 TOTAL SCORE: 1
GAD7 TOTAL SCORE: 1
2. NOT BEING ABLE TO STOP OR CONTROL WORRYING: NOT AT ALL
IF YOU CHECKED OFF ANY PROBLEMS ON THIS QUESTIONNAIRE, HOW DIFFICULT HAVE THESE PROBLEMS MADE IT FOR YOU TO DO YOUR WORK, TAKE CARE OF THINGS AT HOME, OR GET ALONG WITH OTHER PEOPLE: NOT DIFFICULT AT ALL
1. FEELING NERVOUS, ANXIOUS, OR ON EDGE: NOT AT ALL
5. BEING SO RESTLESS THAT IT IS HARD TO SIT STILL: NOT AT ALL
2. NOT BEING ABLE TO STOP OR CONTROL WORRYING: NOT AT ALL
6. BECOMING EASILY ANNOYED OR IRRITABLE: SEVERAL DAYS
4. TROUBLE RELAXING: NOT AT ALL
5. BEING SO RESTLESS THAT IT IS HARD TO SIT STILL: NOT AT ALL
3. WORRYING TOO MUCH ABOUT DIFFERENT THINGS: NOT AT ALL
7. FEELING AFRAID AS IF SOMETHING AWFUL MIGHT HAPPEN: NOT AT ALL
1. FEELING NERVOUS, ANXIOUS, OR ON EDGE: NOT AT ALL
3. WORRYING TOO MUCH ABOUT DIFFERENT THINGS: SEVERAL DAYS
IF YOU CHECKED OFF ANY PROBLEMS ON THIS QUESTIONNAIRE, HOW DIFFICULT HAVE THESE PROBLEMS MADE IT FOR YOU TO DO YOUR WORK, TAKE CARE OF THINGS AT HOME, OR GET ALONG WITH OTHER PEOPLE: NOT DIFFICULT AT ALL

## 2024-09-03 ASSESSMENT — PATIENT HEALTH QUESTIONNAIRE - PHQ9
6. FEELING BAD ABOUT YOURSELF - OR THAT YOU ARE A FAILURE OR HAVE LET YOURSELF OR YOUR FAMILY DOWN: SEVERAL DAYS
SUM OF ALL RESPONSES TO PHQ9 QUESTIONS 1 AND 2: 0
2. FEELING DOWN, DEPRESSED OR HOPELESS: NOT AT ALL
9. THOUGHTS THAT YOU WOULD BE BETTER OFF DEAD, OR OF HURTING YOURSELF: NOT AT ALL
3. TROUBLE FALLING OR STAYING ASLEEP: NOT AT ALL
7. TROUBLE CONCENTRATING ON THINGS, SUCH AS READING THE NEWSPAPER OR WATCHING TELEVISION: NOT AT ALL
5. POOR APPETITE OR OVEREATING: SEVERAL DAYS
3. TROUBLE FALLING OR STAYING ASLEEP OR SLEEPING TOO MUCH: NOT AT ALL
8. MOVING OR SPEAKING SO SLOWLY THAT OTHER PEOPLE COULD HAVE NOTICED. OR THE OPPOSITE, BEING SO FIGETY OR RESTLESS THAT YOU HAVE BEEN MOVING AROUND A LOT MORE THAN USUAL: NOT AT ALL
SUM OF ALL RESPONSES TO PHQ9 QUESTIONS 1 & 2: 0
1. LITTLE INTEREST OR PLEASURE IN DOING THINGS: NOT AT ALL
9. THOUGHTS THAT YOU WOULD BE BETTER OFF DEAD, OR OF HURTING YOURSELF: NOT AT ALL
1. LITTLE INTEREST OR PLEASURE IN DOING THINGS: NOT AT ALL
4. FEELING TIRED OR HAVING LITTLE ENERGY: NOT AT ALL
2. FEELING DOWN, DEPRESSED OR HOPELESS: NOT AT ALL
10. IF YOU CHECKED OFF ANY PROBLEMS, HOW DIFFICULT HAVE THESE PROBLEMS MADE IT FOR YOU TO DO YOUR WORK, TAKE CARE OF THINGS AT HOME, OR GET ALONG WITH OTHER PEOPLE: NOT DIFFICULT AT ALL
SUM OF ALL RESPONSES TO PHQ QUESTIONS 1-9: 2
8. MOVING OR SPEAKING SO SLOWLY THAT OTHER PEOPLE COULD HAVE NOTICED. OR THE OPPOSITE, BEING SO FIGETY OR RESTLESS THAT YOU HAVE BEEN MOVING AROUND A LOT MORE THAN USUAL: NOT AT ALL
SUM OF ALL RESPONSES TO PHQ QUESTIONS 1-9: 2
10. IF YOU CHECKED OFF ANY PROBLEMS, HOW DIFFICULT HAVE THESE PROBLEMS MADE IT FOR YOU TO DO YOUR WORK, TAKE CARE OF THINGS AT HOME, OR GET ALONG WITH OTHER PEOPLE: NOT DIFFICULT AT ALL
7. TROUBLE CONCENTRATING ON THINGS, SUCH AS READING THE NEWSPAPER OR WATCHING TELEVISION: NOT AT ALL
4. FEELING TIRED OR HAVING LITTLE ENERGY: NOT AT ALL
6. FEELING BAD ABOUT YOURSELF - OR THAT YOU ARE A FAILURE OR HAVE LET YOURSELF OR YOUR FAMILY DOWN: SEVERAL DAYS
5. POOR APPETITE OR OVEREATING: SEVERAL DAYS

## 2024-09-03 ASSESSMENT — LIFESTYLE VARIABLES
HOW OFTEN DO YOU HAVE A DRINK CONTAINING ALCOHOL: NEVER
AUDIT-C TOTAL SCORE: 0
SKIP TO QUESTIONS 9-10: 1
HOW OFTEN DO YOU HAVE SIX OR MORE DRINKS ON ONE OCCASION: NEVER
HOW MANY STANDARD DRINKS CONTAINING ALCOHOL DO YOU HAVE ON A TYPICAL DAY: PATIENT DOES NOT DRINK

## 2024-09-03 NOTE — PROGRESS NOTES
"Collaborative Care (CoCM)  Progress Note    Type of Interaction: In Office    Start Time: 10:28 AM    End Time: 11:28 AM    Appointment: Scheduled    Reason for Visit:   Chief Complaint   Patient presents with    Follow-up       Interval History / Patient Symptoms:   Samantha Sunshine is a 24 y.o. female who presents for follow up for Collaborative Care services.     Patient Health Questionnaire-9 Score: 2 (9/3/2024 10:57 AM)  RL-7 Total Score: 1 (9/3/2024 10:57 AM)       Interventions Provided: Relapse Prevention and Discharge Planning    Progress Made: Significant    Response to Intervention:  Patient continues to be stable and managing anxiety and depression symptoms.  It has been discussed that do to ongoing success in managing mental health symptoms, patient is to 'graduate' CoCM program.  Identified Relapse Prevention plan for patient to use if symptoms are starting to worsen or return.    RELAPSE PREVENTION PLAN  pURPOSE: TO HELP YOU IDENTIFY WARNING SIGNS OF INCREASED SYMPTOMS AND REVIEW PERSONAL TRIGGERS, COPING SKILLS/STRATEGIES THAT HAVE WORKED FOR YOU TO PREVENT OR MANAGE SYMPTOMS THAT MAY RETURN, IDENTIFY POSITIVE SUPPORTS, AND BE AWARE OF PERSONS/PROVIDERS YOU CAN CONTACT IF SYMPTOMS RETURN OR WORSEN.  MAINTENANCE MEDICATIONS  NAME DOSE HOW TO TAKE NOTES   Viibryd 40mg 1x/day    2.  buspar 5mg PRN Uses PRN \"once in a blue moon\" when mind won't stop   3.      4.      5.      *Contact Liza Celaya, DO if you'd like to make any changes to your medication(s).    WARNING SIGNS/TRIGGERS  WHAT ARE YOUR EARLY WARNING SIGNS THAT THINGS ARE TOO STRESSFUL, DETERIORATING, OR NOT GOING WELL FOR YOU?  Self seclusion, being short with others, no energy for a few days;   Notices a pattern of thinking about the problem for a few days but able to resolve; if unable to resolve after a week, identified sign that symptoms are worsening.         WHO, OR WHAT, ARE THE SITUATIONS, PEOPLE, PLACES, OR THINGS THAT CONTRIBUTE TO AN " "INCREASE IN STRESS AND/OR SYMPTOMS IN YOUR LIFE?  \"Horrible communication from others: is a big trigger; feels it starts with others ignoring her when she is talking or not giving attention to patient       WHAT STEPS OR ACTIONS CAN YOU TAKE WHEN YOU HAVE TO DEAL WITH THESE SITUATIONS, PEOPLE, PLACES OR THINGS?  Advocating for self with communication, venting about bad days       COPING SKILLS  WHAT ARE SOME COPING SKILLS OR THINGS THAT MAKE YOU FEEL BETTER THAT YOU CAN USE TO PREVENT AND/OR REDUCE SYMPTOMS?    venting   2.  distractions   3.  Moving around, cleaning, schoolwork    4.  Wants to start crocheting      SUPPORTS  NAME RELATIONSHIP PHONE    [place] boyfriend    2.  Mom  3. Summer   friend      CONTACTS- IF SYMPTOMS RETURN, PLEASE CONTACT PCP OR IDENTIFIED SUPPORTS:  PROVIDER NAME ENTITY/PRACTICE PHONE   PCP Liza Celaya DO [facility] [phone]   [facility] SHALOM LESTER [facility] [phone]      Plan:   It has been discussed and decided that patient is ready for discharge from the CoC program.  Relapse prevention plan has been completed.  Patient's plan for ongoing care includes returning to usual care with PCP.  Patient is aware that they may contact Liza Celaya DO if symptoms increase and they need additional support.  No further [facility] interventions planned at this time and patient will be discharged from [facility].  [facility] remains available for consult as needed.    Follow Up / Next Appointment: as needed with PCP. [facility] remains available for consult as needed.   "

## 2024-09-03 NOTE — PROGRESS NOTES
"Subjective   Samantha Sunshine is a 24 y.o. female and is here for follow up on the following:    IRREGULAR CYCLES: despite taking BCPs regularly  Did have workup for PCOS - no trial of Metformin yet      Do you take any herbs or supplements that were not prescribed by a doctor? not asked    SOC Hx: finishing MA school  Tobacco Use: Low Risk  (9/3/2024)    Patient History     Smoking Tobacco Use: Never     Smokeless Tobacco Use: Never     Passive Exposure: Not on file     Alcohol Use: Not At Risk (9/3/2024)    AUDIT-C     Frequency of Alcohol Consumption: Never     Average Number of Drinks: Patient does not drink     Frequency of Binge Drinking: Never     DIET:  1600 Kcal daily, 60 ounces of water    EXERCISE:  walking 30 minutes 5 days per week  Limited weight training - chiropractor following - Dr. Marc Kahn  Has TENS unit and stretches per his routine    ELIMINATION: more constipated recently - taking prn magnesium    URINARY SYSTEM: none    SLEEP:  normal  She does snore - no apneic episodes reported  Doing well on weight loss regimen    MOODS:  stress manageable - still followed by our [facility] Team - on prn sessions     RL-7 Total Score: 1      History:  Menstrual cycles - irregular despite BCPs  Upcoming Ob/gyn appointment 10/2024 - Dr. Briscoe  LMP: No LMP recorded.  Last pap date: has upcoming appointment with ob/gyn  Had PCOS workup with US 2021 - no significant abnormalities noted    Review of Systems   Review of Systems negative except as noted in HPI and Chief complaint.     Objective     VITALS:  /78 (BP Location: Left arm, Patient Position: Sitting, BP Cuff Size: Adult)   Ht 1.6 m (5' 3\")   Wt 121 kg (266 lb)   BMI 47.12 kg/m²      Physical Exam  Constitutional:       General: She is not in acute distress.     Appearance: Normal appearance.   HENT:      Head: Normocephalic and atraumatic.      Right Ear: Tympanic membrane, ear canal and external ear normal.      " Left Ear: Tympanic membrane, ear canal and external ear normal.      Nose: Nose normal.      Mouth/Throat:      Mouth: Mucous membranes are moist.      Pharynx: No oropharyngeal exudate or posterior oropharyngeal erythema.   Eyes:      Extraocular Movements: Extraocular movements intact.      Conjunctiva/sclera: Conjunctivae normal.      Pupils: Pupils are equal, round, and reactive to light.   Cardiovascular:      Rate and Rhythm: Normal rate and regular rhythm.      Heart sounds: No murmur heard.  Pulmonary:      Effort: Pulmonary effort is normal.      Breath sounds: Normal breath sounds.   Abdominal:      General: Bowel sounds are normal.      Palpations: Abdomen is soft.   Musculoskeletal:         General: Normal range of motion.      Cervical back: No rigidity.   Lymphadenopathy:      Cervical: No cervical adenopathy.   Skin:     General: Skin is warm and dry.      Findings: No rash.   Neurological:      General: No focal deficit present.      Mental Status: She is alert and oriented to person, place, and time.      Cranial Nerves: No cranial nerve deficit.      Gait: Gait normal.   Psychiatric:         Mood and Affect: Mood normal.         Behavior: Behavior normal.         Assessment/Plan     Healthy female exam.      1. Please have labs drawn at your earliest convenience.  You should fast 12 hours prior to arriving at the lab - during these 12 hours you may have water, black coffee, black tea - nothing with cream or sugar and no solid foods.    Our office will contact you with results after they have been reviewed.  Please allow 48 - 72 hours for most results, some may take longer.  Please keep in mind that results may post immediately to your online portal, even before we have a chance to review them.  Once we have had the opportunity to review we will post comments and have our staff contact you with results and any instructions.  Please call our office if you do not hear from our office in 7 days.     2.  Patient Counseling:  --Nutrition: Stressed importance of moderation in sodium/caffeine intake, saturated fat and cholesterol, caloric balance, sufficient intake of fresh fruits, vegetables, fiber, calcium, iron, and 1 mg of folate supplement per day (for females capable of pregnancy).  --Exercise: Stressed the importance of regular exercise.   --Immunizations reviewed.  --Discussed benefits of screening colonoscopy(patients > 45 years of age)    3. Discussed the patient's BMI with her.  The BMI is above average. The patient received Current weight:    Weight change since last visit (-) denotes wt loss     Weight loss needed to achieve BMI 25:   Lbs  Weight loss needed to achieve BMI 30:   Lbs because they have an above normal BMI.  BMI was above normal measurement. Current weight:    Weight change since last visit (-) denotes wt loss     Weight loss needed to achieve BMI 25:   Lbs  Weight loss needed to achieve BMI 30:   Lbs  Provided instructions on dietary changes  Provided instructions on exercise  Advised to Increase physical activity    Problem List Items Addressed This Visit       Menorrhagia     Refilling BCPs at current dosage.  Consider trial of metformin for suspected insulin resistance.         Relevant Medications    norethindrone-e.estradioL-iron (Microgestin FE 1/20, 28,) 1 mg-20 mcg (21)/75 mg (7) tablet    Annual physical exam    Relevant Orders    CBC (Completed)    Comprehensive Metabolic Panel (Completed)    Lipid Panel (Completed)    TSH with reflex to Free T4 if abnormal (Completed)    Mixed anxiety and depressive disorder - Primary     Increasing Vilazodone to 40 mg daily.  Suggest addition of Buspirone 1-3 times daily.  Reviewed risks, side effects and expected treatment course of medications.  Call with any problems or concerns.     Continue follow up with Behavioral Health Collaborative Care Team as needed.         Relevant Medications    busPIRone (Buspar) 10 mg tablet    vilazodone  (Viibryd) 40 mg tablet    azelastine-fluticasone (Dymista) 137-50 mcg/spray nasal spray     Other Visit Diagnoses       Dysfunction of both eustachian tubes        Relevant Orders    Referral to ENT    Vitamin D deficiency        Relevant Orders    Vitamin D 25-Hydroxy,Total (for eval of Vitamin D levels) (Completed)             Follow up in 6 months for CPE.

## 2024-09-12 ENCOUNTER — TELEPHONE (OUTPATIENT)
Dept: PRIMARY CARE | Facility: CLINIC | Age: 24
End: 2024-09-12
Payer: COMMERCIAL

## 2024-09-12 NOTE — TELEPHONE ENCOUNTER
Pt called regarding her paperwork for her emotional support animal. She would like to know if it has been completed?

## 2024-09-13 NOTE — ASSESSMENT & PLAN NOTE
Increasing Vilazodone to 40 mg daily.  Suggest addition of Buspirone 1-3 times daily.  Reviewed risks, side effects and expected treatment course of medications.  Call with any problems or concerns.     Continue follow up with Behavioral Health Collaborative Care Team as needed.

## 2024-09-24 ENCOUNTER — DOCUMENTATION (OUTPATIENT)
Dept: PRIMARY CARE | Facility: CLINIC | Age: 24
End: 2024-09-24
Payer: COMMERCIAL

## 2024-09-24 DIAGNOSIS — F41.8 MIXED ANXIETY AND DEPRESSIVE DISORDER: Primary | ICD-10-CM

## 2024-10-15 ENCOUNTER — OFFICE VISIT (OUTPATIENT)
Dept: OBSTETRICS AND GYNECOLOGY | Facility: HOSPITAL | Age: 24
End: 2024-10-15
Payer: COMMERCIAL

## 2024-10-15 VITALS
HEIGHT: 63 IN | DIASTOLIC BLOOD PRESSURE: 86 MMHG | WEIGHT: 276 LBS | BODY MASS INDEX: 48.9 KG/M2 | SYSTOLIC BLOOD PRESSURE: 136 MMHG

## 2024-10-15 DIAGNOSIS — E28.2 PCOS (POLYCYSTIC OVARIAN SYNDROME): ICD-10-CM

## 2024-10-15 DIAGNOSIS — Z01.419 WELL WOMAN EXAM WITH ROUTINE GYNECOLOGICAL EXAM: Primary | ICD-10-CM

## 2024-10-15 DIAGNOSIS — R10.2 PELVIC PAIN IN FEMALE: ICD-10-CM

## 2024-10-15 DIAGNOSIS — Z11.3 SCREEN FOR STD (SEXUALLY TRANSMITTED DISEASE): ICD-10-CM

## 2024-10-15 DIAGNOSIS — Z12.4 PAP SMEAR FOR CERVICAL CANCER SCREENING: ICD-10-CM

## 2024-10-15 PROCEDURE — 99385 PREV VISIT NEW AGE 18-39: CPT | Performed by: OBSTETRICS & GYNECOLOGY

## 2024-10-15 PROCEDURE — 87661 TRICHOMONAS VAGINALIS AMPLIF: CPT | Performed by: OBSTETRICS & GYNECOLOGY

## 2024-10-15 PROCEDURE — 87491 CHLMYD TRACH DNA AMP PROBE: CPT | Performed by: OBSTETRICS & GYNECOLOGY

## 2024-10-15 RX ORDER — NORETHINDRONE 0.35 MG/1
1 TABLET ORAL DAILY
Qty: 84 TABLET | Refills: 3 | Status: SHIPPED | OUTPATIENT
Start: 2024-10-15 | End: 2025-10-15

## 2024-10-15 SDOH — ECONOMIC STABILITY: FOOD INSECURITY: WITHIN THE PAST 12 MONTHS, THE FOOD YOU BOUGHT JUST DIDN'T LAST AND YOU DIDN'T HAVE MONEY TO GET MORE.: NEVER TRUE

## 2024-10-15 SDOH — ECONOMIC STABILITY: FOOD INSECURITY: WITHIN THE PAST 12 MONTHS, YOU WORRIED THAT YOUR FOOD WOULD RUN OUT BEFORE YOU GOT MONEY TO BUY MORE.: NEVER TRUE

## 2024-10-15 ASSESSMENT — ENCOUNTER SYMPTOMS
ENDOCRINE NEGATIVE: 0
RESPIRATORY NEGATIVE: 0
PSYCHIATRIC NEGATIVE: 1
NEUROLOGICAL NEGATIVE: 0
EYES NEGATIVE: 1
MUSCULOSKELETAL NEGATIVE: 0
HEMATOLOGIC/LYMPHATIC NEGATIVE: 0
GASTROINTESTINAL NEGATIVE: 0
CONSTITUTIONAL NEGATIVE: 0
ALLERGIC/IMMUNOLOGIC NEGATIVE: 0
CARDIOVASCULAR NEGATIVE: 0

## 2024-10-15 ASSESSMENT — PATIENT HEALTH QUESTIONNAIRE - PHQ9
2. FEELING DOWN, DEPRESSED OR HOPELESS: NOT AT ALL
1. LITTLE INTEREST OR PLEASURE IN DOING THINGS: NOT AT ALL
SUM OF ALL RESPONSES TO PHQ9 QUESTIONS 1 & 2: 0

## 2024-10-15 ASSESSMENT — PAIN SCALES - GENERAL: PAINLEVEL: 0-NO PAIN

## 2024-10-15 NOTE — PROGRESS NOTES
Subjective   Patient ID: Samantha Sunshine is a 24 y.o. female who presents for Annual Exam (Patient here forannual exam/Patient reports never having PAP/Patient was DX in 2021 with PCOS/LMP 8-20-24/Patient reports spotting today for about 1 week/Patient taking BC pills/Patient has concerns about family HX of endometriosis/ cervical cancer/Patient declined STI testing/Patient denies falls/Patient denies pain).  24 year old for first Pap.   Concerned about family history of endometriosis and cervical cancer.   Sexually active, one partner currently. Has had other partners in the past. Ok with STI screening.   Hx PCOS- on OCPs (Microgestin 1/20)- diagnosed in 2021 based on elevated testosterone levels and irregular cycles. Does not remember having regular cycles.   Has pain/cramping-radiates down her legs. Can make her feel like she can't move. Has been happening for about 3 years. Feels like it has been getting worse. Occurs sporadically throughout the month.   Migraines with an aura, acne, back pain, pulling pains, vomiting at times, nausea. The auras are new- include lights, voices.   Had an ultrasound in 2021, WNL.     Review of Systems   All other systems reviewed and are negative.    Objective   Physical Exam  Vitals reviewed.   Constitutional:       Appearance: Normal appearance.   HENT:      Head: Normocephalic and atraumatic.      Right Ear: External ear normal.      Left Ear: External ear normal.      Nose: Nose normal.      Mouth/Throat:      Mouth: Mucous membranes are moist.   Eyes:      Extraocular Movements: Extraocular movements intact.   Neck:      Thyroid: No thyroid mass or thyromegaly.   Cardiovascular:      Rate and Rhythm: Normal rate and regular rhythm.      Heart sounds: Normal heart sounds.   Pulmonary:      Effort: Pulmonary effort is normal.      Breath sounds: Normal breath sounds.   Chest:   Breasts:     Right: Normal.      Left: Normal.   Abdominal:      General: Abdomen is flat. Bowel sounds  are normal.      Palpations: Abdomen is soft.      Tenderness: There is no abdominal tenderness. There is no right CVA tenderness or left CVA tenderness.   Genitourinary:     General: Normal vulva.      Exam position: Lithotomy position.      Labia:         Right: No lesion.         Left: No lesion.       Urethra: No prolapse, urethral swelling or urethral lesion.      Vagina: Normal.      Cervix: Normal.      Uterus: Normal. Tender.       Adnexa: Right adnexa normal and left adnexa normal.   Musculoskeletal:         General: Normal range of motion.      Right shoulder: Normal.      Left shoulder: Normal.      Cervical back: Normal, normal range of motion and neck supple.      Thoracic back: Normal.      Lumbar back: Normal.      Right hip: Normal.      Left hip: Normal.      Right upper leg: Normal.      Left upper leg: Normal.      Right knee: Normal.      Left knee: Normal.      Right lower leg: Normal.      Left lower leg: Normal.   Lymphadenopathy:      Upper Body:      Right upper body: No axillary adenopathy.      Left upper body: No axillary adenopathy.      Lower Body: No right inguinal adenopathy. No left inguinal adenopathy.   Skin:     General: Skin is warm and dry.   Neurological:      General: No focal deficit present.      Mental Status: She is alert and oriented to person, place, and time.      Cranial Nerves: Cranial nerves 2-12 are intact.      Motor: Motor function is intact.   Psychiatric:         Attention and Perception: Attention and perception normal.         Mood and Affect: Mood and affect normal.         Behavior: Behavior normal.         Cognition and Memory: Cognition normal.         Judgment: Judgment normal.     Assessment/Plan   Problem List Items Addressed This Visit             ICD-10-CM    PCOS (polycystic ovarian syndrome) E28.2    Relevant Medications    norethindrone (Micronor) 0.35 mg tablet     Other Visit Diagnoses         Codes    Well woman exam with routine gynecological  exam    -  Primary Z01.419    Pap smear for cervical cancer screening     Z12.4    Relevant Orders    THINPREP PAP    Screen for STD (sexually transmitted disease)     Z11.3    Relevant Orders    THINPREP PAP    Pelvic pain in female     R10.2    Relevant Medications    norethindrone (Micronor) 0.35 mg tablet    Other Relevant Orders    US PELVIS TRANSABDOMINAL WITH TRANSVAGINAL        Ilsa Briscoe MD 10/15/24 10:22 AM

## 2024-10-17 LAB
C TRACH RRNA SPEC QL NAA+PROBE: NEGATIVE
N GONORRHOEA DNA SPEC QL PROBE+SIG AMP: NEGATIVE
T VAGINALIS RRNA SPEC QL NAA+PROBE: NEGATIVE

## 2024-10-29 LAB
CYTOLOGY CMNT CVX/VAG CYTO-IMP: NORMAL
LAB AP CONTRACEPTIVE HISTORY: NORMAL
LAB AP HPV GENOTYPE QUESTION: NO
LAB AP HPV HR: NORMAL
LAB AP PAP ADDITIONAL TESTS: NORMAL
LABORATORY COMMENT REPORT: NORMAL
LMP START DATE: NORMAL
MENSTRUAL HX REPORTED: NORMAL
PATH REPORT.TOTAL CANCER: NORMAL

## 2024-11-04 ENCOUNTER — APPOINTMENT (OUTPATIENT)
Dept: PRIMARY CARE | Facility: CLINIC | Age: 24
End: 2024-11-04
Payer: COMMERCIAL

## 2024-11-04 VITALS — OXYGEN SATURATION: 98 % | HEART RATE: 96 BPM | DIASTOLIC BLOOD PRESSURE: 84 MMHG | SYSTOLIC BLOOD PRESSURE: 124 MMHG

## 2024-11-04 DIAGNOSIS — H69.93 DYSFUNCTION OF BOTH EUSTACHIAN TUBES: ICD-10-CM

## 2024-11-04 DIAGNOSIS — G43.109 MIGRAINE WITH AURA AND WITHOUT STATUS MIGRAINOSUS, NOT INTRACTABLE: Primary | ICD-10-CM

## 2024-11-04 PROCEDURE — 99214 OFFICE O/P EST MOD 30 MIN: CPT | Performed by: FAMILY MEDICINE

## 2024-11-04 PROCEDURE — 1036F TOBACCO NON-USER: CPT | Performed by: FAMILY MEDICINE

## 2024-11-04 RX ORDER — PREDNISONE 20 MG/1
TABLET ORAL
Qty: 9 TABLET | Refills: 0 | Status: SHIPPED | OUTPATIENT
Start: 2024-11-04 | End: 2024-11-10

## 2024-11-04 RX ORDER — SUMATRIPTAN 50 MG/1
50 TABLET, FILM COATED ORAL ONCE AS NEEDED
Qty: 9 TABLET | Refills: 1 | Status: SHIPPED | OUTPATIENT
Start: 2024-11-04 | End: 2025-11-04

## 2024-11-04 RX ORDER — FLUTICASONE FUROATE 27.5 UG/1
2 SPRAY, METERED NASAL
Qty: 10 G | Refills: 5 | Status: SHIPPED | OUTPATIENT
Start: 2024-11-04 | End: 2025-11-04

## 2024-11-04 ASSESSMENT — LIFESTYLE VARIABLES
HOW OFTEN DO YOU HAVE A DRINK CONTAINING ALCOHOL: MONTHLY OR LESS
AUDIT-C TOTAL SCORE: 1
SKIP TO QUESTIONS 9-10: 1
HOW MANY STANDARD DRINKS CONTAINING ALCOHOL DO YOU HAVE ON A TYPICAL DAY: PATIENT DOES NOT DRINK
HOW OFTEN DO YOU HAVE SIX OR MORE DRINKS ON ONE OCCASION: NEVER

## 2024-11-04 NOTE — PROGRESS NOTES
Subjective   Patient ID: Samantha Sunshine is a 24 y.o. female who presents for Follow-up (Migraines not controlled- went to ER./They come and go- she states they seem to be getting worse the more she has. ).    HPI    MIGRAINE HA:  Reports frequent headaches started approximately 2 months ago - no trigger or change  in meds that she was aware of  She was treating with OTC NSAIDS and Tylenol  Describes headaches as bilateral and starting either at the top of her head or at the base of her neck  and moves forward  Sometimes along bilateral frontal areas    Had Ob/gyn appointment 10/15/2024  who referred her to neurology    Now estimates having headache symptoms 2-3 times per week  Sometimes lasting all day - tried Tylenol or Ibuprofen - not much relief  Light and sound sensitivity associated with these headaches.  Symptoms were progressively worsening and finally her boyfriend took her to ED for nausea and severe headaches    ED visit 10/21/2024 for recurring migraine type headaches  FROM ED REPORT: Had HA's on and off for 2-3 weeks prior to that visit  Headache auras including visual lights/fogginess and some sounds  Light and sound sensitivities associated with these headaches    Given Toradol and Benadryl which did seem to help and headaches were a little better the rest of the week  Treated with Amox for possible sinus infection noted right maxillary sinus opacity on CT scan    Ob/gyn changed her BCPs to progesterone the week prior to ED visit   No association with menstrual cycles that she can see    She does have neurology appointment set for December with Dr. Melissa at  whom she has seen in the past.    She has missed several days of work due to headaches.    No sleep study yet - has discussed in the past as well.    STOP-BANG Sleep Apnea Questionnaire    STOP  Do you SNORE loudly (louder than talking or loud enough to be heard through closed doors)? No    Do you often feel TIRED, fatigued, or sleep during daytime?   Yes    Has anyone OBSERVED you stop breathing during your sleep? No    Do you have or are you being treated for high blood PRESSURE? No    BANG  BMI more than 35 KG/M2? Yes    AGE over 50 years old? No    NECK circumference > 16 in (40 cm)? No    GENDER Male? No    TOTAL YES responses: 2    HIGH risk of PEGGY: 5-8  INTERMEDIATE risk of PEGGY: 3-4  LOW risk of PEGGY: 0-2    EPWORTH SLEEPINESS SCALE (ESS) of sleep disorders    Situation = Chance of Dozing  0 -would never doze  1- slight chance of dozing  2 - moderate chance of dozing  3 - high chance of dozing    SITTING & READIN  WATCHING TELEVISION: 0  SITTING IN ACTIVE IN A PUBLIC PLACE (THEATER, MEETING): 0  PASSENGER IN A CAR FOR 60 + MINUTES: 0  LYING DOWN TO REST IN AFTERNOON: 2  SITTING & TALKING TO SOMEONE: 0  SITTING QUIETLY AFTER LUNCH (WHEN YOU HAVE HAD NO ALCOHOL): 0  IN A CAR WHILE STOPPED IN TRAFFIC: 0    TOTAL: 2    FMLA paperwork requested for intermittent FMLA until treatment plans established.    Review of Systems    Review of Systems negative except as noted in HPI and Chief complaint.     Objective             VITALS:  /84 (BP Location: Left arm, Patient Position: Sitting, BP Cuff Size: Adult)   Pulse 96   LMP 2024 (Exact Date)   SpO2 98%      Physical Exam  Constitutional:       General: She is not in acute distress.     Appearance: Normal appearance. She is obese. She is not ill-appearing.   HENT:      Head: Normocephalic and atraumatic.      Right Ear: Tympanic membrane, ear canal and external ear normal.      Left Ear: Tympanic membrane, ear canal and external ear normal.      Nose: Congestion present.      Mouth/Throat:      Mouth: Mucous membranes are moist.      Pharynx: Posterior oropharyngeal erythema present. No oropharyngeal exudate.      Comments: Small posterior pharynx  Eyes:      Conjunctiva/sclera: Conjunctivae normal.      Pupils: Pupils are equal, round, and reactive to light.   Neck:      Vascular: No carotid  bruit.   Cardiovascular:      Rate and Rhythm: Normal rate and regular rhythm.      Pulses: Normal pulses.      Heart sounds: Normal heart sounds. No murmur heard.     No gallop.   Pulmonary:      Effort: Pulmonary effort is normal.      Breath sounds: Normal breath sounds. No wheezing, rhonchi or rales.   Musculoskeletal:      Cervical back: Normal range of motion and neck supple. No rigidity or tenderness.   Lymphadenopathy:      Cervical: No cervical adenopathy.   Skin:     General: Skin is warm and dry.   Neurological:      General: No focal deficit present.      Mental Status: She is alert and oriented to person, place, and time.      Cranial Nerves: No cranial nerve deficit.      Coordination: Coordination normal.      Deep Tendon Reflexes: Reflexes normal.   Psychiatric:         Mood and Affect: Mood normal.         Behavior: Behavior normal.         Assessment/Plan   Problem List Items Addressed This Visit       Migraine with aura and without status migrainosus, not intractable - Primary     Trial of Prednisone to help break recurring headache cycle.  Sumatriptan started for PRN use.  Reviewed risks, side effects and expected treatment course of medications.  Call with any problems or concerns.   If headaches not improving would like patient to bring her headache log and follow up in 1 month since neurology appointment is not until December.         Relevant Medications    SUMAtriptan (Imitrex) 50 mg tablet    predniSONE (Deltasone) 20 mg tablet    Other Relevant Orders    Home sleep apnea test (HSAT)     Other Visit Diagnoses       Dysfunction of both eustachian tubes        Relevant Medications    fluticasone (Flonase Sensimist) 27.5 mcg/actuation nasal spray        FMLA paperwork filled out and faxed today.    FOLLOW UP 1 MONTH, SOONER WITH ANY PROBLEMS OR CONCERNS.    Time Spent  Prep time on day of patient encounter: 5 minutes  Time spent directly with patient, family or caregiver: 20  minutes  Additional Time Spent on Patient Care Activities: 5 minutes ([place] paperwork)  Documentation Time: 10 minutes  Other Time Spent: 0 minutes  Total: 40 minutes

## 2024-11-04 NOTE — PATIENT INSTRUCTIONS
Some suggestions for preventing or controlling your migraines:  1 - Riboflavin 200 mg twice daily  (Vitamin B2) and Magnesium 250 - 300 mg daily with food for prevention.  2 - Co Q10 100 mg daily increasing to 400 mg daily may be helpful as well.  3 - Avoid triggers that can cause or worsen migraines (food triggers, lack of sleep, stress)  4 - Keep a diary of your headaches to note how often you get them, how long they last and any other helpful information.  5 - Avoid taking medication for treatment of headaches (exception would be preventative medications that should be taken daily) more than 3 days per week.  This includes both prescription medication and over the counter medications.  6 - Take your preventative medication as directed. Let me know if you have side effects or problems with the medication. Do not suddenly stop taking the medication.  7 - Regular exercise, 30-60 minutes of cardio 5 days per week.

## 2024-11-04 NOTE — ASSESSMENT & PLAN NOTE
Trial of Prednisone to help break recurring headache cycle.  Sumatriptan started for PRN use.  Reviewed risks, side effects and expected treatment course of medications.  Call with any problems or concerns.   If headaches not improving would like patient to bring her headache log and follow up in 1 month since neurology appointment is not until December.

## 2024-12-05 ENCOUNTER — OFFICE VISIT (OUTPATIENT)
Dept: NEUROLOGY | Facility: CLINIC | Age: 24
End: 2024-12-05
Payer: COMMERCIAL

## 2024-12-05 VITALS — RESPIRATION RATE: 18 BRPM | WEIGHT: 277 LBS | BODY MASS INDEX: 49.07 KG/M2

## 2024-12-05 DIAGNOSIS — R51.9 NONINTRACTABLE HEADACHE, UNSPECIFIED CHRONICITY PATTERN, UNSPECIFIED HEADACHE TYPE: Primary | ICD-10-CM

## 2024-12-05 PROCEDURE — 99215 OFFICE O/P EST HI 40 MIN: CPT | Mod: GC | Performed by: PSYCHIATRY & NEUROLOGY

## 2024-12-05 PROCEDURE — 99215 OFFICE O/P EST HI 40 MIN: CPT | Performed by: PSYCHIATRY & NEUROLOGY

## 2024-12-05 ASSESSMENT — PAIN SCALES - GENERAL: PAINLEVEL_OUTOF10: 4

## 2024-12-05 NOTE — PROGRESS NOTES
Subjective     History of Present Illness     HPI:  Consultation on this 22 year old R-handed woman with history of dizziness, anxiety, depression, PCOS here for headaches.      History summary:  Dizziness started Jan 2022; sometimes when she stands up quickly but also after walking for a while or sitting, at which times she gets a random bout of dizziness, as lightheadedness when getting up, briefly. Other times it is a brief, max 30 sec, whoosh of dizziness, subjective 'inside her head' spinning. Bending over and turning corners might bring it on. Every day once or twice. HR also goes up at times. Happened after Covid in 2020. Sometimes gets a nagging HA afterwards. More dizzy spells after a night of poor sleep.      The patient denies visual loss, diplopia, bulbar symptoms, weakness, spasticity, sensory loss, dysesthesias, paresthesias, incoordination, difficulty with gait but trios over her feet. Has ENT coming up b/o fluids in both ears, left ear worse hearing, not new.    Interval History:  Dizziness has not improved but now having migraines and on OCPs. Her migraines started decreased frequency she really started noticing them in second week of October. They were initially tylenol responsive. But they have been getting worse in pain and frequency. Now they are 1-4 a week. She is functional mostly but sometimes it completely knocks her out. Grogginess, dry heaving. When headaches are bad she have photo and phono sensitivity. A lot of migraine history in family. Has family history of blood clotting with respect to stroke. She says that laying down does not make headache worse. Sitting up does not make it worse either. When asked about other triggers, she does not have any. With regard to aura, things get a lot brighter before her headache, this precedes headache by 15-20 minutes. No recent head trauma. No pregnancy.  Location of headache is bifrontal. Also a spot in back of head. Headache can move from front to  back. Once she takes sumatriptan the headache aborts in 25-30 minutes. She is having headaches about 11 days a month. One headache has taken her from work. She does not have blurry or double vision.        Review of Systems  10-point review of systems was negative except as mentioned in the HPI and/or in the [facility] Department of Neurology health assessment form.        Active Problems  Problems    · ADD (attention deficit disorder) (314.00) (F98.8)   · Anxiety (300.00) (F41.9)   · Body mass index (BMI) of 45.0 to 49.9 in adult (V85.42) (Z68.42)   · Dizziness (780.4) (R42)   · Encounter for immunization (V03.89) (Z23)   · High risk medication use (V58.69) (Z79.899)   · Morbid obesity with BMI of 45.0-49.9, adult (278.01,V85.42) (E66.01,Z68.42)   · Motion sickness (994.6) (T75.3XXA)   · PCOS (polycystic ovarian syndrome) (256.4) (E28.2)   · Recurrent major depressive disorder, remission status unspecified (296.30) (F33.9)     Past Medical History  Problems    · History of Acute URI (465.9) (J06.9)   · Resolved Date: 28 Dec 2020   · History of Acute UTI (599.0) (N39.0)   · Resolved Date: 28 Dec 2020   · History of attention deficit disorder (V11.8) (Z86.59)   · Resolved Date: 28 Dec 2020     Surgical History  Problems    · History of Tonsillectomy     Family History  Mother    · No pertinent family history  Father    · No pertinent family history  Maternal Grandfather    · Family history of Alzheimer's disease (V17.2) (Z82.0)     Social History  Problems    · Does not use birth control (stopped 1 week ago because trying to child)   · Does not use illicit drugs (V49.89) (Z78.9)   · Never used tobacco (V49.89) (Z78.9)   · No alcohol use   · Single     Allergies  Medication    · No Known Drug Allergies   Recorded By: Brooklyn Joyner; 10/21/2019 10:01:04 AM     Current Meds     Current Outpatient Medications   Medication Instructions    busPIRone (BUSPAR) 5 mg, oral, 3 times daily    cetirizine (ZYRTEC) 10 mg, oral, Daily     fluticasone (Flonase Sensimist) 27.5 mcg/actuation nasal spray 2 sprays, Each Nostril, Daily RT    fluticasone (Flonase) 50 mcg/actuation nasal spray 2 sprays, Each Nostril, Daily    norethindrone (MICRONOR) 0.35 mg, oral, Daily    omeprazole (PriLOSEC) 20 mg DR capsule TAKE 1 CAPSULE BY MOUTH 30 TO 60 MIN BEFORE BREAKFAST AND DINNER    SUMAtriptan (IMITREX) 50 mg, oral, Once as needed, May repeat after 2 hours.    vilazodone (VIIBRYD) 40 mg, oral, Daily with breakfast         Vitals  Vital Signs     Recorded: [id number] 10:11AM   Heart Rate 93   Respiration 18   Systolic 149   Diastolic 104   Height 5 ft 3 in   Weight 285 lb    BMI Calculated 50.49 kg/m2   BSA Calculated 2.25   Tobacco Use b) No   Falls Screening (Age 18+) a) No falls within the last year   Pain Scale 0   supine /73 HR 98  supine BP HR unable due to machine malfunction, large body habitus, reported just a few seconds of lightheadedness right after standing up         Physical Exam    Mental status: Ms. Sunshine is awake, alert and appropriate, with fluent speech, no word finding difficulties and no difficulty answering questions. She is well oriented to time, place and person. Her memory for remote events is intact. Her memory for recent events is normal. Attention and concentration is intact.  Her fund of knowledge is fair. No apraxia is noted.    Fundoscopic examination: Fundi are flat with sharp disc margins bilaterally.    Cranial nerves:   CN 2: Pupils are equal, round and reactive to light bilaterally. Visual fields are full to confrontation without visual extinction.   CN 3, 4, 6: Extraocular movements are intact.  Smooth pursuit is intact.  Saccades are normal initiation, velocity and amplitude both vertically and horizontally.   CN 5: Facial sensation is intact to light touch bilaterally.  CN 7: Facial strength is full bilaterally.   CN 8: Hearing is intact to finger rub bilaterally.  CN 9, 10: The soft palate elevates symmetrically.    CN 11: Sternocleidomastoid and trapezius muscle strength is full bilaterally.  CN 12: Tongue protrudes in the midline with intact strength. There are no tongue fasciculations noted.              Motor Examination: Bulk is normal throughout.    Strength L R   Deltoid 5 5   Biceps 5 5   Triceps 5 5    5 5   Iliopsoas 5 5   Gluteal Ext 5 5   Gluteal Abduction 5 5   Quadriceps 5 5   Hamstrings 5 5   Tensor Fasciae Latae 5 5   TA 5 5   Gastroc 5 5     Sensory examination: Sensation is intact to light touch.  Reflexes: Positioning during reflex exam: patient sitting in chair.    Reflexes L R   Biceps 2 2   Triceps 2 2   Brachioradialis 2 2   Patellar 2 2   Achilles mute mute     Coordination: Finger-nose-finger and heel-knee-shin testing is normal with no dysmetria bilaterally.   Gait: She has an upright and steady stance with normal stride length and arm swing.  Toe, heel, and tandem walking is intact.  No Romberg's sign is present.      Assessment and Plan    Consultation on this 22 year old R-handed woman with history of dizziness, anxiety, depression, PCOS here for headaches. Exam and history consistent with migraines and less consistent with venous sinus thrombosis or RCVS.     Plan  - will not start prophylactic migraine medication now since patient is trying to pregnancy  - MRI brain w/o contrast  - will not schedule follow up but reach out if she need to be seen again or changes in symptoms    Neil Krueger MD PhD  PGY-2 Neurology    I have seen and evaluated the patient with Dr Krueger, I agree with the assessment and plan, which were formulated with my direct input.

## 2024-12-05 NOTE — PATIENT INSTRUCTIONS
Dear Mrs. Sunshine,    You were seen in  Neurology clinic for headaches. Your presentation and exam are consistent with migraines but we will have you obtain MRI to see if there is any other explanation. Continue with sumatriptan for your migraines. Reach out to see us again if things worsen.    Thank you for entrusting us with your care.    Sincerely,  Your  Neurology Team

## 2024-12-22 ENCOUNTER — PATIENT MESSAGE (OUTPATIENT)
Dept: PRIMARY CARE | Facility: CLINIC | Age: 24
End: 2024-12-22
Payer: COMMERCIAL

## 2024-12-30 ENCOUNTER — APPOINTMENT (OUTPATIENT)
Dept: PRIMARY CARE | Facility: CLINIC | Age: 24
End: 2024-12-30
Payer: COMMERCIAL

## 2024-12-30 VITALS — SYSTOLIC BLOOD PRESSURE: 120 MMHG | OXYGEN SATURATION: 98 % | HEART RATE: 80 BPM | DIASTOLIC BLOOD PRESSURE: 80 MMHG

## 2024-12-30 DIAGNOSIS — F41.8 MIXED ANXIETY AND DEPRESSIVE DISORDER: ICD-10-CM

## 2024-12-30 DIAGNOSIS — Z3A.01 LESS THAN 8 WEEKS GESTATION OF PREGNANCY (HHS-HCC): Primary | ICD-10-CM

## 2024-12-30 DIAGNOSIS — K21.9 GASTROESOPHAGEAL REFLUX DISEASE WITHOUT ESOPHAGITIS: ICD-10-CM

## 2024-12-30 DIAGNOSIS — N92.6 MISSED PERIOD: ICD-10-CM

## 2024-12-30 LAB — PREGNANCY TEST URINE, POC: POSITIVE

## 2024-12-30 PROCEDURE — 87491 CHLMYD TRACH DNA AMP PROBE: CPT

## 2024-12-30 PROCEDURE — 87086 URINE CULTURE/COLONY COUNT: CPT

## 2024-12-30 PROCEDURE — 87591 N.GONORRHOEAE DNA AMP PROB: CPT

## 2024-12-30 PROCEDURE — 99214 OFFICE O/P EST MOD 30 MIN: CPT | Performed by: FAMILY MEDICINE

## 2024-12-30 PROCEDURE — 1036F TOBACCO NON-USER: CPT | Performed by: FAMILY MEDICINE

## 2024-12-30 PROCEDURE — 81025 URINE PREGNANCY TEST: CPT | Performed by: FAMILY MEDICINE

## 2024-12-30 RX ORDER — ESCITALOPRAM OXALATE 20 MG/1
20 TABLET ORAL DAILY
Qty: 30 TABLET | Refills: 5 | Status: SHIPPED | OUTPATIENT
Start: 2024-12-30 | End: 2025-06-28

## 2024-12-30 RX ORDER — FAMOTIDINE 40 MG/1
40 TABLET, FILM COATED ORAL 2 TIMES DAILY
Qty: 60 TABLET | Refills: 5 | Status: SHIPPED | OUTPATIENT
Start: 2024-12-30 | End: 2025-06-28

## 2024-12-30 RX ORDER — BUSPIRONE HYDROCHLORIDE 10 MG/1
5 TABLET ORAL 3 TIMES DAILY
Qty: 270 TABLET | Refills: 1 | Status: SHIPPED | OUTPATIENT
Start: 2024-12-30

## 2024-12-30 ASSESSMENT — PATIENT HEALTH QUESTIONNAIRE - PHQ9
8. MOVING OR SPEAKING SO SLOWLY THAT OTHER PEOPLE COULD HAVE NOTICED. OR THE OPPOSITE, BEING SO FIGETY OR RESTLESS THAT YOU HAVE BEEN MOVING AROUND A LOT MORE THAN USUAL: NOT AT ALL
6. FEELING BAD ABOUT YOURSELF - OR THAT YOU ARE A FAILURE OR HAVE LET YOURSELF OR YOUR FAMILY DOWN: NOT AT ALL
3. TROUBLE FALLING OR STAYING ASLEEP OR SLEEPING TOO MUCH: NOT AT ALL
4. FEELING TIRED OR HAVING LITTLE ENERGY: SEVERAL DAYS
1. LITTLE INTEREST OR PLEASURE IN DOING THINGS: NOT AT ALL
10. IF YOU CHECKED OFF ANY PROBLEMS, HOW DIFFICULT HAVE THESE PROBLEMS MADE IT FOR YOU TO DO YOUR WORK, TAKE CARE OF THINGS AT HOME, OR GET ALONG WITH OTHER PEOPLE: NOT DIFFICULT AT ALL
SUM OF ALL RESPONSES TO PHQ QUESTIONS 1-9: 1
SUM OF ALL RESPONSES TO PHQ9 QUESTIONS 1 AND 2: 0
9. THOUGHTS THAT YOU WOULD BE BETTER OFF DEAD, OR OF HURTING YOURSELF: NOT AT ALL
5. POOR APPETITE OR OVEREATING: NOT AT ALL
2. FEELING DOWN, DEPRESSED OR HOPELESS: NOT AT ALL
7. TROUBLE CONCENTRATING ON THINGS, SUCH AS READING THE NEWSPAPER OR WATCHING TELEVISION: NOT AT ALL

## 2024-12-30 ASSESSMENT — ANXIETY QUESTIONNAIRES
3. WORRYING TOO MUCH ABOUT DIFFERENT THINGS: NOT AT ALL
IF YOU CHECKED OFF ANY PROBLEMS ON THIS QUESTIONNAIRE, HOW DIFFICULT HAVE THESE PROBLEMS MADE IT FOR YOU TO DO YOUR WORK, TAKE CARE OF THINGS AT HOME, OR GET ALONG WITH OTHER PEOPLE: SOMEWHAT DIFFICULT
4. TROUBLE RELAXING: NOT AT ALL
1. FEELING NERVOUS, ANXIOUS, OR ON EDGE: SEVERAL DAYS
6. BECOMING EASILY ANNOYED OR IRRITABLE: SEVERAL DAYS
GAD7 TOTAL SCORE: 2
2. NOT BEING ABLE TO STOP OR CONTROL WORRYING: NOT AT ALL
5. BEING SO RESTLESS THAT IT IS HARD TO SIT STILL: NOT AT ALL
7. FEELING AFRAID AS IF SOMETHING AWFUL MIGHT HAPPEN: NOT AT ALL

## 2024-12-30 NOTE — PROGRESS NOTES
Subjective   Patient ID: Samantha Sunshine is a 24 y.o. female who presents for Amenorrhea.    HPI    LMP 11/13/2024  Today: 6 weeks 5 days pregnant    EDC by dates 8/20/2025    Mild nausea - worse at night - if she sucks on hard candy during the day helps  Taking prenatal gummies OTC  Has appointment set up with Ob/gyn through [facility]    Review of Systems    Review of Systems negative except as noted in HPI and Chief complaint.     Objective   Patient Health Questionnaire-9 Score: 1     RL-7 Total Score: 2     VITALS:  /80 (BP Location: Left arm, Patient Position: Sitting, BP Cuff Size: Adult)   Pulse 80   LMP 11/13/2024 (Exact Date)   SpO2 98%      Physical Exam  Constitutional:       General: She is not in acute distress.     Appearance: Normal appearance. She is not ill-appearing.   HENT:      Head: Normocephalic and atraumatic.   Neck:      Vascular: No carotid bruit.   Cardiovascular:      Rate and Rhythm: Normal rate and regular rhythm.      Pulses: Normal pulses.      Heart sounds: Normal heart sounds. No murmur heard.     No gallop.   Pulmonary:      Effort: Pulmonary effort is normal.      Breath sounds: Normal breath sounds. No wheezing, rhonchi or rales.   Musculoskeletal:      Cervical back: Normal range of motion and neck supple. No rigidity or tenderness.   Lymphadenopathy:      Cervical: No cervical adenopathy.   Skin:     General: Skin is warm and dry.   Neurological:      Mental Status: She is alert.   Psychiatric:         Mood and Affect: Mood normal.         Behavior: Behavior normal.       Assessment/Plan   Problem List Items Addressed This Visit       GERD (gastroesophageal reflux disease)     Trial of Famotidine for worsening GERD.  Call if not improving.         Relevant Medications    famotidine (Pepcid) 40 mg tablet    Mixed anxiety and depressive disorder    Relevant Medications    busPIRone (Buspar) 10 mg tablet    escitalopram (Lexapro) 20 mg tablet     Other Visit Diagnoses        Less than 8 weeks gestation of pregnancy ([facility])    -  Primary    Starting Prenatal vits.  Reviewed medication safety during pregnancy.    Relevant Medications    prenatal vitamin, iron-folic, 27 mg iron-800 mcg folic acid tablet    Other Relevant Orders    HIV 1/2 Antigen/Antibody Screen with Reflex to Confirmation    CBC    Hepatitis B Surface Antigen    Hepatitis C Antibody    Rubella Antibody, IgG    Varicella Zoster Antibody, IgG    Syphilis Screen with Reflex    C. trachomatis / N. gonorrhoeae, Amplified    Urine Culture    HCG, quantitative, pregnancy    Missed period        Relevant Orders    POCT pregnancy, urine manually resulted (Completed)    HCG, quantitative, pregnancy            FOLLOW UP PRN, SOONER WITH ANY PROBLEMS OR CONCERNS.

## 2024-12-30 NOTE — PATIENT INSTRUCTIONS
Transitioning Viibryd to Lexapro:    Week 1: Viibryd 40 mg alternating with Lexapro 20 mg   Week 2: Viibryd 20 mg alternating with Lexapro 20 mg  Week 3: Stop Viibryd and take Lexapro 20 mg daily    Stop Omeprazole - switching to Famotidine 40 mg twice daily

## 2024-12-31 LAB
BACTERIA UR CULT: NORMAL
C TRACH RRNA SPEC QL NAA+PROBE: NEGATIVE
N GONORRHOEA DNA SPEC QL PROBE+SIG AMP: NEGATIVE

## 2025-01-16 ENCOUNTER — PATIENT MESSAGE (OUTPATIENT)
Dept: PRIMARY CARE | Facility: CLINIC | Age: 25
End: 2025-01-16
Payer: COMMERCIAL

## 2025-01-20 NOTE — PROGRESS NOTES
I can open her to bridge to something more long term, if that is ok with you.  I am thinking that she would benefit from the   Behavioral Health program. They offer group and individual sessions virtually, and she would be eligible to participate beginning at 12 weeks pregnant through one year postpartum.

## 2025-01-21 DIAGNOSIS — F41.9 ANXIETY: Primary | ICD-10-CM

## 2025-01-22 ENCOUNTER — APPOINTMENT (OUTPATIENT)
Dept: AUDIOLOGY | Facility: CLINIC | Age: 25
End: 2025-01-22
Payer: COMMERCIAL

## 2025-01-22 ENCOUNTER — APPOINTMENT (OUTPATIENT)
Dept: OTOLARYNGOLOGY | Facility: CLINIC | Age: 25
End: 2025-01-22
Payer: COMMERCIAL

## 2025-01-22 DIAGNOSIS — F41.8 MIXED ANXIETY AND DEPRESSIVE DISORDER: ICD-10-CM

## 2025-01-23 RX ORDER — ESCITALOPRAM OXALATE 20 MG/1
20 TABLET ORAL DAILY
Qty: 90 TABLET | Refills: 0 | Status: SHIPPED | OUTPATIENT
Start: 2025-01-23

## 2025-02-05 ENCOUNTER — APPOINTMENT (OUTPATIENT)
Dept: PRIMARY CARE | Facility: CLINIC | Age: 25
End: 2025-02-05
Payer: COMMERCIAL

## 2025-03-03 ENCOUNTER — APPOINTMENT (OUTPATIENT)
Dept: PRIMARY CARE | Facility: CLINIC | Age: 25
End: 2025-03-03
Payer: COMMERCIAL

## 2025-03-03 ENCOUNTER — SOCIAL WORK (OUTPATIENT)
Dept: PRIMARY CARE | Facility: CLINIC | Age: 25
End: 2025-03-03
Payer: COMMERCIAL

## 2025-03-03 VITALS
BODY MASS INDEX: 49.95 KG/M2 | SYSTOLIC BLOOD PRESSURE: 126 MMHG | WEIGHT: 282 LBS | HEART RATE: 78 BPM | DIASTOLIC BLOOD PRESSURE: 80 MMHG | OXYGEN SATURATION: 97 %

## 2025-03-03 DIAGNOSIS — L30.9 ECZEMA, UNSPECIFIED TYPE: ICD-10-CM

## 2025-03-03 DIAGNOSIS — F41.8 MIXED ANXIETY AND DEPRESSIVE DISORDER: Primary | ICD-10-CM

## 2025-03-03 DIAGNOSIS — F41.9 ANXIETY: ICD-10-CM

## 2025-03-03 PROCEDURE — 99214 OFFICE O/P EST MOD 30 MIN: CPT | Performed by: FAMILY MEDICINE

## 2025-03-03 RX ORDER — ESCITALOPRAM OXALATE 20 MG/1
20 TABLET ORAL DAILY
Qty: 90 TABLET | Refills: 1 | Status: SHIPPED | OUTPATIENT
Start: 2025-03-03

## 2025-03-03 RX ORDER — TRIAMCINOLONE ACETONIDE 1 MG/G
CREAM TOPICAL 2 TIMES DAILY
Qty: 80 G | Refills: 0 | Status: SHIPPED | OUTPATIENT
Start: 2025-03-03

## 2025-03-03 ASSESSMENT — ANXIETY QUESTIONNAIRES
4. TROUBLE RELAXING: NOT AT ALL
5. BEING SO RESTLESS THAT IT IS HARD TO SIT STILL: SEVERAL DAYS
1. FEELING NERVOUS, ANXIOUS, OR ON EDGE: NOT AT ALL
3. WORRYING TOO MUCH ABOUT DIFFERENT THINGS: NOT AT ALL
6. BECOMING EASILY ANNOYED OR IRRITABLE: SEVERAL DAYS
2. NOT BEING ABLE TO STOP OR CONTROL WORRYING: NOT AT ALL
GAD7 TOTAL SCORE: 2
IF YOU CHECKED OFF ANY PROBLEMS ON THIS QUESTIONNAIRE, HOW DIFFICULT HAVE THESE PROBLEMS MADE IT FOR YOU TO DO YOUR WORK, TAKE CARE OF THINGS AT HOME, OR GET ALONG WITH OTHER PEOPLE: NOT DIFFICULT AT ALL
7. FEELING AFRAID AS IF SOMETHING AWFUL MIGHT HAPPEN: NOT AT ALL

## 2025-03-03 ASSESSMENT — PATIENT HEALTH QUESTIONNAIRE - PHQ9
10. IF YOU CHECKED OFF ANY PROBLEMS, HOW DIFFICULT HAVE THESE PROBLEMS MADE IT FOR YOU TO DO YOUR WORK, TAKE CARE OF THINGS AT HOME, OR GET ALONG WITH OTHER PEOPLE: NOT DIFFICULT AT ALL
2. FEELING DOWN, DEPRESSED OR HOPELESS: NOT AT ALL
9. THOUGHTS THAT YOU WOULD BE BETTER OFF DEAD, OR OF HURTING YOURSELF: NOT AT ALL
SUM OF ALL RESPONSES TO PHQ QUESTIONS 1-9: 2
6. FEELING BAD ABOUT YOURSELF - OR THAT YOU ARE A FAILURE OR HAVE LET YOURSELF OR YOUR FAMILY DOWN: NOT AT ALL
SUM OF ALL RESPONSES TO PHQ9 QUESTIONS 1 & 2: 1
8. MOVING OR SPEAKING SO SLOWLY THAT OTHER PEOPLE COULD HAVE NOTICED. OR THE OPPOSITE, BEING SO FIGETY OR RESTLESS THAT YOU HAVE BEEN MOVING AROUND A LOT MORE THAN USUAL: NOT AT ALL
4. FEELING TIRED OR HAVING LITTLE ENERGY: SEVERAL DAYS
7. TROUBLE CONCENTRATING ON THINGS, SUCH AS READING THE NEWSPAPER OR WATCHING TELEVISION: NOT AT ALL
5. POOR APPETITE OR OVEREATING: NOT AT ALL
1. LITTLE INTEREST OR PLEASURE IN DOING THINGS: SEVERAL DAYS
3. TROUBLE FALLING OR STAYING ASLEEP: NOT AT ALL

## 2025-03-03 NOTE — PROGRESS NOTES
Coll[facility]ative Care ([facility]) Initial Assessment  Appointment Time  Start: 7:55 AM  End: 8:56 AM    Collaborative Care program information (including case discussion with psychiatry, involvement of [facility] and billing when applicable) was provided and discussed with the patient. Patient Indicated understanding and agreed to proceed.   Confirm: Yes    Patient Health Questionnaire-9 Score: 2 (3/3/2025  8:13 AM)  RL-7 Total Score: 2 (3/3/2025  8:12 AM)      Reason for Visit / Chief Complaint  Chief Complaint   Patient presents with    Re-Establishing [facility] appointment     Referring Provider: Liza Celaya DO    Brief Summary: Samantha Sunshine is presenting to appointment seeking re-evaluation from the [facility] program for concerns of  depression and anxiety with recent positive pregnancy test.  Plan is for patient to re-establish with Collaborative Care to bridge to traditional counseling and/or   behavioral health services.  History provided by patient and accompanied to appointment by Self.  Past mental health diagnoses include ADD, Anxiety, Recurrent major depressive disorder and mixed anxiety and depressive disorder.    Review of Symptoms  Mood   Patient reports since she has been pregnant, she has felt less depression and states that she hasn't noticed any increased irritability or crying.    Anxiety   Patient reports little to no anxiety currently, feels that she is managing her emotions well, considering she is managing varying hormone levels.    Other Psychiatric Review Of Systems:  Attention Deficit/Hyperactivity Symptoms: hx of ADHD and past use of stimulant medication    Anger/Irritability sx: Pt reports feeling more irritable at work  Appetite Sx:    eating more frequently and packing bigger lunches- hard boiled eggs, veggies/salads, protein  Sleep Sx:    first trimester sleeping more, now more up and down; waking to use restroom at night but able to fall back asleep.  Finds it increasingly more  uncomfortable to sleep in normal sleeping positions    Comprehensive Behavioral Health History   Associated Medical Concerns   Potential Associated Factors: Pregnancy, GERD, Migraines    Medications  Current Mental Health Medications:   Lexapro 20mg 1x/day  Buspar 10mg 1/2 tab 3x/day only when needed- took 2x recently (day told parents about pregnancy and when she flew on an airplane)    Past Mental Health Medications:   Viibryd  Hydroxyzine  Adderall (2019)    Concerns / challenges / barriers with taking medications? N/A    Open to medication recommendations from consulting psychiatrist? Yes, to identify if medication is healthy with pregnancy    Do you ever forget to take your medication? No- patient states she has a solid routine for taking her medication in the morning    Mental Health Treatment History  Current Mental Health Treatment: N/A  Previous Mental Health Treatment: Collaborative Care in July 2023    Substance Use/Treamtent History  Social History     Substance and Sexual Activity   Alcohol Use Not Currently    Comment: socially     Social History     Substance and Sexual Activity   Drug Use Never       Use of Drugs: {CoCM Substances:46467} {CoCM Substance use:57137}  Use of Alcohol: {etoh use:63105}   Use of Caffeine: {caffeine use:90096}  Addiction Treatment: {CoCM Types of addiction tx:73938}  Currently Sober? {Yes, No:37400} {CoCM Status of sobriety:01899}    Self-Esteem/Self-Image/Self-Expression  Self Esteem Rating (1-10 Scale, 10 being high): {NUMBERS 0-10:43197}  Self-Esteem / Self Image Sx: {CoCM Self Esteem Symptoms:29775}  Communication Style & Concerns: {CoCM Communication Style and Concerns:29957}  Strengths: {CoCM Strengths:62066}    Functional impairment   Impacting ADL's: {CoCM Impacting ADLs:80887}   Impacting IADL's: {CoCM Impacting IADLs:72317}  Impacting Ability : {CoCM Impacting ability:13647}    Trauma    PTSD Symptoms: {Post-Traumatic Stress Disorder Symptoms:21275}  Symptoms  Onset/Duration: {[facility] Symptoms Onset/Duration:87541}  Traumatic Experiences: {[facility] Traumatic experiences:85245}  Current Symptoms Related to Traumatic Experience: {[facility] symptoms related to trauma:04652}  Triggers: {[facility] Triggers:35797}    Abuse History  Physical Abuse: {Yes, No:08725}  Sexual Abuse: {Yes, No:63758}  Verbal / Emotional Abuse / Bullying (+Cyber): {Yes, No:54248}   Financial Abuse: {Yes, No:58837}  Domestic Violence: {Yes, No:04573}    Grief / Loss / Adjustment   Symptom Onset/Duration: {[facility] Symptom onset/duration - 6 months:53443}  Current Sx: {[facility] Grief/Loss Symptoms:42786}  Factors of Grief / Loss / Adjustment: {[facility] Factors of Grief/Loss/Adjustment:98526}  Aunt recently     Risk History  Suicidal Thoughts/Attempts:  Suicidal Thoughts/Method/Intent/Plan/Preparations: {[facility] Suicide Risk:51102}   Number of Suicide Attempts: {NUMBERS; 0-6:26805}  Access to Firearms/Lethal Means: {[facility] Access to firearms/lethal means:88643}  Non-Suicidal Self-injurious behavior/risky behavior: {[facility] Non-suicidal self injury:13892}    Suicide Risk Assessment:   Patient Assessed for risk of suicide: ***  Last Thoreau Risk Score: ***  Protective Factors: {[facility] Protective Factors:05392}    Homicidal Thoughts/Attempts:  Homicidal Thoughts/Method/Plan/Intent: {[facility] Homicidal thoughts:13182}   Number of Attempts: {NUMBERS; 0-6:50110}    Social History  Housing   Living Situation: {[facility] Living situation:30969}  Safe Housing Conditions / Feels Safe in Home: {Yes, No:07448}    Employment  Current Employment: {[facility] Employment:08478}  Current Concerns/Challenges: {YES-DESCRIBE/NO:75746}    Income   Financial Stability: {YES-DESCRIBE/NO:16118}  Receive Benefits/Assistance: {YES-DESCRIBE/NO:60131}    Education   Status / Level of Education: {[facility] Education:15131}      Legal   Legal Considerations: {[facility] Legal:94805}    Relationships   S/O:  ***  Children: ***  Parents/Guardian: ***  Siblings: ***  Friends: ***    Family  "History of Mental Health:  Mental Health / Conditions  Maternal: ***  Paternal: ***    Substance Use  Maternal: ***  Paternal: ***    Family History of Suicide  Maternal: ***  Paternal: ***    Psychosocial Stressors:  Psychosocial Stressors: { :56902}.    Supports:   Supports: {[facility] Family members:29300}    Coping Skills:   Coping:  {[facility] Coping skills:02189}       Active Duty? {Yes, No:71002}  Are you a ? {Yes, No:95543}  Branch Area: {[facility] :64849}  Were you in combat? {[facility]  combat:73652}  Discharge Status: {[facility]  discharge:89279}  Do you receive VA Benefits: {Yes, No:81012}    Sexuality / Gender   Sexual Orientation/Preferred Pronouns: {[facility] Sexual orientation:13783} {[facility] preferred gender pronouns/identity:07676}  Concerns with Intimacy with significant other: {yes/no:58922::\"no\"}    Transportation   Transportation Concerns: {[facility] Transportation:20933}    Orthodox/ Spirituality   Are you Congregational or Spiritual: {Yes, No:89372}  {[facility] Orthodox/Practice:62692}  {[facility] Spiritual practice:55885}    Mental Status Evaluation:  Appearance: {exam; general psych:35357}  Behavior: {exam; behavior :36790}  Speech: {findings; speech psych:93977}  Mood: {mood:57788}  Affect: {desc; affect:91756::\"normal\"}  Thought Process: {thought process:42908}  Thought Content: {thought content:22315}  Sensorium: {orientation:93247}  Cognition: {77220}  Insight: {insight/judgement:96173}  Judgment: {insight/judgement:74986}    Assessment Summary  / Plan  Goals:  Patient Goals for [facility]: Stay consistent nd feel good- stay on top of things    Plan:   {[facility] Plan:09396}    Provisional Findings / Impressions  Primary: ***    Secondary: ***    Follow Up / Next Appointment: Next appointment: 03/25/25   "

## 2025-03-03 NOTE — PROGRESS NOTES
Subjective   Patient ID: Samantha Sunshine is a 25 y.o. female who presents for Follow-up (6 month follow up.) and Eczema (Small patch- ).    HPI    EDC 9/1/2025  Doing well - nausea improving  Frequent urination  No abdominal pain or cramping  Taking Prenatal Vits without difficulty    ANXIETY & DEPRESSION: feeling well with transition from Viibryd to Escitalopram.  Ob feels comfortable with treatment plans  She is feeling great  - working with Behavioral Health Collaborative Care Team  regularly  Discussed concerns of postpartum symptoms  Patient Health Questionnaire-9 Score: 2  RL-7: 2    HAND ECZEMA: itching and some small bumps along her fingertips and between her fingers  Using over-the-counter lotions without much change.    Review of Systems    Review of Systems negative except as noted in HPI and Chief complaint.     Objective   Patient Health Questionnaire-9 Score: 2    VITALS:  /80 (BP Location: Left arm, Patient Position: Sitting, BP Cuff Size: Adult)   Pulse 78   Wt 128 kg (282 lb)   LMP 11/13/2024 (Exact Date)   SpO2 97%   BMI 49.95 kg/m²      Physical Exam  Constitutional:       General: She is not in acute distress.     Appearance: Normal appearance. She is not ill-appearing.   HENT:      Head: Normocephalic and atraumatic.   Neck:      Vascular: No carotid bruit.   Cardiovascular:      Rate and Rhythm: Normal rate and regular rhythm.      Pulses: Normal pulses.      Heart sounds: Normal heart sounds. No murmur heard.     No gallop.   Pulmonary:      Effort: Pulmonary effort is normal.      Breath sounds: Normal breath sounds. No wheezing, rhonchi or rales.   Musculoskeletal:      Cervical back: Normal range of motion and neck supple. No rigidity or tenderness.   Lymphadenopathy:      Cervical: No cervical adenopathy.   Skin:     General: Skin is warm and dry.      Comments: Small flesh-colored bumps and fingertips in between finger webbing, no vesicular appearance, no wheezing no crusting.    Neurological:      Mental Status: She is alert.   Psychiatric:         Mood and Affect: Mood normal.         Behavior: Behavior normal.         Assessment/Plan   Problem List Items Addressed This Visit       Mixed anxiety and depressive disorder - Primary     Doing well with transition to escitalopram.  Continue current dosage.  Continue follow-up with our collaborative care behavioral health team.  Follow-up 6 months, sooner with any questions or concerns.         Relevant Medications    escitalopram (Lexapro) 20 mg tablet    Eczema     Reviewed skin care, suggest good moisturization.  Treating with topical triamcinolone, use twice daily for 2 weeks on and then take 2 weeks off.  Call if not improving.         Relevant Medications    triamcinolone (Kenalog) 0.1 % cream     Also discussed risks of postpartum depression, will monitor closely near delivery time.    FOLLOW UP 6 MONTHS, SOONER WITH ANY PROBLEMS OR CONCERNS.

## 2025-03-04 PROBLEM — L30.9 ECZEMA: Status: ACTIVE | Noted: 2025-03-04

## 2025-03-04 PROBLEM — Z00.00 ANNUAL PHYSICAL EXAM: Status: RESOLVED | Noted: 2023-06-29 | Resolved: 2025-03-04

## 2025-03-04 NOTE — ASSESSMENT & PLAN NOTE
Doing well with transition to escitalopram.  Continue current dosage.  Continue follow-up with our collaborative care behavioral health team.  Follow-up 6 months, sooner with any questions or concerns.

## 2025-03-04 NOTE — ASSESSMENT & PLAN NOTE
Reviewed skin care, suggest good moisturization.  Treating with topical triamcinolone, use twice daily for 2 weeks on and then take 2 weeks off.  Call if not improving.

## 2025-03-07 ENCOUNTER — DOCUMENTATION (OUTPATIENT)
Dept: BEHAVIORAL HEALTH | Facility: HOSPITAL | Age: 25
End: 2025-03-07
Payer: COMMERCIAL

## 2025-03-07 NOTE — PROGRESS NOTES
[facility] Consult Note     Samantha Sunshine is a 25 y.o., referred to [facility] for symptoms of depression and anxiety. I have reviewed the patient with the behavioral health manager and reviewed the patient's electronic record on 2025.    Brief summary:   Patient is a 25-year-old female who presented to the collaborative care clinic secondary to anxiety and depression.  Patient is  who currently lives with her boyfriend.  Patient reports the pregnancy as somewhat planned.  Patient is currently on Lexapro 20 mg p.o. daily and BuSpar.  Patient has taken BuSpar twice since prescribed.  Patient also has a history of being on Viibryd but this was stopped for an unknown reason.      Patient initially seen by [facility] in .  See below-    [facility] Consult Note      Samantha Sunshine is a 23 y.o., referred to [facility] for symptoms of anxiety and depression. I have reviewed the patient with the behavioral health manager and reviewed the patient's electronic record. Pertinent highlights of discussion and chart review include the following:   Patient reported psychiatric history of anxiety / depression / ADHD; recalled onset of anxiety in 9th grade while at band camp when she saw peers pass out / have a seizure, and subsequently developed fears of having similar problems. Anxiety led to depression and poor performance in college (dropped out).   Patient stated that she is usually able to identify when she starts going into a depressive period, and can sometimes catch herself early and get out of it - otherwise will be in that place for months.   Current stressor: has been getting sick a lot and has missed a lot of work (employed as [employer]); will be going back to school in August to pursue nursing degree.   Sleep: no concerns with sleep initiation or maintenance; reported that she loves to sleep and would sleep all the time if able   Substances: Prior use of marijuana and delta-8; none  "recently. Intermittent EtOH   Family history of anxiety, ADHD, substance abuse in multiple family members   Wishes: open to med recommendations, but won't take Zoloft due to prior intolerance     Psychotropic medications:   Current: escitalopram 40mg (20mg x2) daily - recently increased dose from 30mg (6/29/23), tolerating well thus far with uncertain clinical response   Prior sertraline: poor tolerance due to feeling like she was \"going through the motions\"; had no motivation or drive   Prior Adderall: poor tolerance due to feeling like a \"zombie\"; XR formulation caused flat affect   Prior Focalin and Ritalin: poor tolerance due to feeling like she had \"ants jumping out of her skin\"  Possible prior atomoxetine (no recollection of response)      Patient Health Questionnaire-9 Score: 5 (7/11/2023  6:00 PM)  RL-7 Total Score: 5 (7/11/2023  6:00 PM)     Additional data:   Recent labs: CBC / BMP not pertinent 5/4/23; TSH not pertinent 5/3/23; HgbA1c 5.5 10/13/22    ECG 5/10/23 reported as normal; QTcB 446   Pertinent PMH: irregular menses & menorrhagia / GERD / PCOS / severe obesity      Recommendations:   Behavioral health manager (BHM) to continue to engage with patient   Agree with continuation of escitalopram 40mg at this time; if unable to tolerate or with incomplete clinical response after ~6-8 weeks at this dose, would recommend transition to alternative medication.    Recommend BHM use motivational interviewing to facilitate healthy lifestyle choices (e.g. adherence to medications, consistent engagement with mental health services), clarify ADHD symptoms (current and historical) as well as primary symptom targets for pharmacotherapy (to aid in selection of potential future agent), and evaluate for premenstrual symptom variation, as discussed in case review.      The above treatment considerations and suggestions are based on consultations with the patient's care manager and a review of information available in " the electronic medical record. I have not personally examined the patient. All recommendations should be implemented with consideration of the patient's relevant prior history and current clinical status. Please feel free to call me with any questions about the care of this patient. I can easily be reached through [employer], or via email ([email or web address]).       Electronically signed by Bill Rangel MD PhD at 7/13/2023  1:11 PM       Recommendations: Major Depression Disorder mild, Anxiety Unspecified  -Discontinue lexapro 20 mg po qday. Start Zoloft 50mg po qday ( safe in pregnancy and breast feeding). If unable to tolerate zoloft, consider alternate SSRI with relative safety in pregnancy and breastfeeding.    -Start Buspar 5 mg po BID scheduled (may not need long term if symptoms can be managed with zoloft/SSRI alone). Likewise, patient may want to be on buspar alone. Buspar has increased efficacy when taken daily and scheduled. Limited data regarding safety in pregnancy or breast feeding.    -Recommendations to follow with Dr. Talbot in [facility]    -Behavioral health manager to monitor symptomology and provide support      Patient Health Questionnaire-9 Score: 2 (3/3/2025  8:13 AM)  RL-7 Total Score: 2 (3/3/2025  8:12 AM)    Low PHQ-9 and RL 7 scores may be a result of controlled symptomology such as in remission mood disorders, and/or mild symptoms. Additionally, low scores may indicate  subthreshold symptoms that may not meeting clinical criteria for mood disorders such as major depressive disorder or anxiety disorder. Lastly, underreporting of symptoms may also be reason for lower scores.       The above treatment considerations and suggestions are based on consultations with the patient's care manager and a review of information available in the electronic medical record. I have not personally examined the patient. All recommendations should be implemented with  consideration of the patient's relevant prior history and current clinical status. Please feel free to call me with any questions about the care of this patient.

## 2025-03-25 ENCOUNTER — APPOINTMENT (OUTPATIENT)
Dept: PRIMARY CARE | Facility: CLINIC | Age: 25
End: 2025-03-25
Payer: COMMERCIAL

## 2025-03-31 ENCOUNTER — DOCUMENTATION (OUTPATIENT)
Dept: PRIMARY CARE | Facility: CLINIC | Age: 25
End: 2025-03-31
Payer: COMMERCIAL

## 2025-03-31 DIAGNOSIS — F41.8 MIXED ANXIETY AND DEPRESSIVE DISORDER: Primary | ICD-10-CM

## 2025-03-31 PROCEDURE — 99492 1ST PSYC COLLAB CARE MGMT: CPT | Performed by: FAMILY MEDICINE

## 2025-04-16 ENCOUNTER — APPOINTMENT (OUTPATIENT)
Dept: PRIMARY CARE | Facility: CLINIC | Age: 25
End: 2025-04-16
Payer: COMMERCIAL

## 2025-04-16 ASSESSMENT — PATIENT HEALTH QUESTIONNAIRE - PHQ9
6. FEELING BAD ABOUT YOURSELF - OR THAT YOU ARE A FAILURE OR HAVE LET YOURSELF OR YOUR FAMILY DOWN: SEVERAL DAYS
2. FEELING DOWN, DEPRESSED OR HOPELESS: SEVERAL DAYS
5. POOR APPETITE OR OVEREATING: NOT AT ALL
7. TROUBLE CONCENTRATING ON THINGS, SUCH AS READING THE NEWSPAPER OR WATCHING TELEVISION: NOT AT ALL
4. FEELING TIRED OR HAVING LITTLE ENERGY: SEVERAL DAYS
8. MOVING OR SPEAKING SO SLOWLY THAT OTHER PEOPLE COULD HAVE NOTICED. OR THE OPPOSITE, BEING SO FIGETY OR RESTLESS THAT YOU HAVE BEEN MOVING AROUND A LOT MORE THAN USUAL: NOT AT ALL
1. LITTLE INTEREST OR PLEASURE IN DOING THINGS: NOT AT ALL
3. TROUBLE FALLING OR STAYING ASLEEP: NOT AT ALL
9. THOUGHTS THAT YOU WOULD BE BETTER OFF DEAD, OR OF HURTING YOURSELF: NOT AT ALL
10. IF YOU CHECKED OFF ANY PROBLEMS, HOW DIFFICULT HAVE THESE PROBLEMS MADE IT FOR YOU TO DO YOUR WORK, TAKE CARE OF THINGS AT HOME, OR GET ALONG WITH OTHER PEOPLE: SOMEWHAT DIFFICULT
SUM OF ALL RESPONSES TO PHQ9 QUESTIONS 1 & 2: 1
SUM OF ALL RESPONSES TO PHQ QUESTIONS 1-9: 3

## 2025-04-16 ASSESSMENT — ANXIETY QUESTIONNAIRES
5. BEING SO RESTLESS THAT IT IS HARD TO SIT STILL: NOT AT ALL
1. FEELING NERVOUS, ANXIOUS, OR ON EDGE: SEVERAL DAYS
6. BECOMING EASILY ANNOYED OR IRRITABLE: SEVERAL DAYS
GAD7 TOTAL SCORE: 4
7. FEELING AFRAID AS IF SOMETHING AWFUL MIGHT HAPPEN: NOT AT ALL
4. TROUBLE RELAXING: NOT AT ALL
3. WORRYING TOO MUCH ABOUT DIFFERENT THINGS: SEVERAL DAYS
2. NOT BEING ABLE TO STOP OR CONTROL WORRYING: SEVERAL DAYS
IF YOU CHECKED OFF ANY PROBLEMS ON THIS QUESTIONNAIRE, HOW DIFFICULT HAVE THESE PROBLEMS MADE IT FOR YOU TO DO YOUR WORK, TAKE CARE OF THINGS AT HOME, OR GET ALONG WITH OTHER PEOPLE: SOMEWHAT DIFFICULT

## 2025-04-16 NOTE — PROGRESS NOTES
Collaborative Care ([facility])  Progress Note    Type of Interaction: VIrtual    Start Time: 12:00 PM    End Time: 12:56 PM    Appointment: Scheduled    Reason for Visit:   Chief Complaint   Patient presents with    Follow-up       Interval History / Patient Symptoms:   Samantha Sunshine is a 25 y.o. female currently enrolled in the [facility] program for symptom monitoring, brief therapy, and support. Patient presents today for follow up for Collaborative Care services.     Metrics:  Patient Health Questionnaire-9 Score: 3 (4/16/2025 12:09 PM)  RL-7 Total Score: 4 (4/16/2025 12:08 PM)       Over the past 2 weeks, how often have you been bothered by any of the following problems?  Little interest or pleasure in doing things: Not at all  Feeling down, depressed, or hopeless: Several days  Trouble falling or staying asleep, or sleeping too much: Not at all  Feeling tired or having little energy: Several days  Poor appetite or overeating: Not at all  Feeling bad about yourself - or that you are a failure or have let yourself or your family down: Several days  Trouble concentrating on things, such as reading the newspaper or watching television: Not at all  Moving or speaking so slowly that other people could have noticed? Or the opposite - being so fidgety or restless that you have been moving around a lot more than usual.: Not at all  Thoughts that you would be better off dead or hurting yourself in some way: Not at all  Patient Health Questionnaire-9 Score: 3     RL-7  Feeling Nervous, Anxious, or on Edge: Several days  Not Being Able to Stop or Control Worrying: Several days  Worrying too Much About Different Things: Several days  Trouble Relaxing: Not at all  Being so Restless That it is Hard to Sit Still: Not at all  Becoming Easily Annoyed or Irritable: Several days  Feeling Afraid as if Something Awful Might Happen: Not at all  RL-7 Total Score: 4  If you checked off any problems, how difficult have these problems made it for  you to do your work, take care of things at home, or get along with other people?: Somewhat difficult     Interventions Provided:   Boundaries, Psychoeducation/Patient education, and Cognitive Behavioral Therapy (CBT)    Progress Made:   As Expected    Response to Intervention:  We discussed the following elements during appointment:   Mood fluctuations- yesterday got into a bad mood/depressed, able to get out of it with help of fiance  Had financial concern, able to learn from mistake and now has other strategies in place to keep from happening again  Spent time reflecting on patient's current job, frustrations she has, and the toll it is taking on her mental health. Discussed boundaries, how much she is willing to take, and how to say 'no'.    MEDICATION: lexapro 20mg- stable    Patient was engaged, responsive, and interactive.     Plan:   Work on saying 'no' in circumstances that are appropriate    Follow Up / Next Appointment: Next appointment: 05/08/25

## 2025-04-30 ENCOUNTER — DOCUMENTATION (OUTPATIENT)
Dept: PRIMARY CARE | Facility: CLINIC | Age: 25
End: 2025-04-30
Payer: COMMERCIAL

## 2025-04-30 DIAGNOSIS — F41.9 ANXIETY: Primary | ICD-10-CM

## 2025-05-08 ENCOUNTER — APPOINTMENT (OUTPATIENT)
Dept: PRIMARY CARE | Facility: CLINIC | Age: 25
End: 2025-05-08
Payer: COMMERCIAL

## 2025-05-08 ASSESSMENT — PATIENT HEALTH QUESTIONNAIRE - PHQ9
3. TROUBLE FALLING OR STAYING ASLEEP: NOT AT ALL
4. FEELING TIRED OR HAVING LITTLE ENERGY: SEVERAL DAYS
5. POOR APPETITE OR OVEREATING: NOT AT ALL
10. IF YOU CHECKED OFF ANY PROBLEMS, HOW DIFFICULT HAVE THESE PROBLEMS MADE IT FOR YOU TO DO YOUR WORK, TAKE CARE OF THINGS AT HOME, OR GET ALONG WITH OTHER PEOPLE: SOMEWHAT DIFFICULT
SUM OF ALL RESPONSES TO PHQ QUESTIONS 1-9: 2
2. FEELING DOWN, DEPRESSED OR HOPELESS: SEVERAL DAYS
SUM OF ALL RESPONSES TO PHQ9 QUESTIONS 1 & 2: 1
1. LITTLE INTEREST OR PLEASURE IN DOING THINGS: NOT AT ALL
9. THOUGHTS THAT YOU WOULD BE BETTER OFF DEAD, OR OF HURTING YOURSELF: NOT AT ALL
7. TROUBLE CONCENTRATING ON THINGS, SUCH AS READING THE NEWSPAPER OR WATCHING TELEVISION: NOT AT ALL
8. MOVING OR SPEAKING SO SLOWLY THAT OTHER PEOPLE COULD HAVE NOTICED. OR THE OPPOSITE, BEING SO FIGETY OR RESTLESS THAT YOU HAVE BEEN MOVING AROUND A LOT MORE THAN USUAL: NOT AT ALL
6. FEELING BAD ABOUT YOURSELF - OR THAT YOU ARE A FAILURE OR HAVE LET YOURSELF OR YOUR FAMILY DOWN: NOT AT ALL

## 2025-05-08 ASSESSMENT — ANXIETY QUESTIONNAIRES
GAD7 TOTAL SCORE: 3
3. WORRYING TOO MUCH ABOUT DIFFERENT THINGS: NOT AT ALL
4. TROUBLE RELAXING: NOT AT ALL
2. NOT BEING ABLE TO STOP OR CONTROL WORRYING: NOT AT ALL
1. FEELING NERVOUS, ANXIOUS, OR ON EDGE: SEVERAL DAYS
6. BECOMING EASILY ANNOYED OR IRRITABLE: SEVERAL DAYS
7. FEELING AFRAID AS IF SOMETHING AWFUL MIGHT HAPPEN: SEVERAL DAYS
5. BEING SO RESTLESS THAT IT IS HARD TO SIT STILL: NOT AT ALL
IF YOU CHECKED OFF ANY PROBLEMS ON THIS QUESTIONNAIRE, HOW DIFFICULT HAVE THESE PROBLEMS MADE IT FOR YOU TO DO YOUR WORK, TAKE CARE OF THINGS AT HOME, OR GET ALONG WITH OTHER PEOPLE: SOMEWHAT DIFFICULT

## 2025-05-08 NOTE — PROGRESS NOTES
Collaborative Care ([facility])  Progress Note    Type of Interaction: Virtual    Start Time: 12:02 PM    End Time: 12:50 PM    Appointment: Scheduled    Reason for Visit:   Chief Complaint   Patient presents with    Follow-up       Interval History / Patient Symptoms:   Samantha Sunshine is a 25 y.o. female currently enrolled in the [facility] program for symptom monitoring, brief therapy, and support. Patient presents today for follow up for Collaborative Care services.     Metrics:  Patient Health Questionnaire-9 Score: 2 (5/8/2025 12:04 PM)  RL-7 Total Score: 3 (5/8/2025 12:06 PM)       Over the past 2 weeks, how often have you been bothered by any of the following problems?  Little interest or pleasure in doing things: Not at all  Feeling down, depressed, or hopeless: Several days  Trouble falling or staying asleep, or sleeping too much: Not at all  Feeling tired or having little energy: Several days  Poor appetite or overeating: Not at all  Feeling bad about yourself - or that you are a failure or have let yourself or your family down: Not at all  Trouble concentrating on things, such as reading the newspaper or watching television: Not at all  Moving or speaking so slowly that other people could have noticed? Or the opposite - being so fidgety or restless that you have been moving around a lot more than usual.: Not at all  Thoughts that you would be better off dead or hurting yourself in some way: Not at all  Patient Health Questionnaire-9 Score: 2     RL-7  Feeling Nervous, Anxious, or on Edge: Several days  Not Being Able to Stop or Control Worrying: Not at all  Worrying too Much About Different Things: Not at all  Trouble Relaxing: Not at all  Being so Restless That it is Hard to Sit Still: Not at all  Becoming Easily Annoyed or Irritable: Several days  Feeling Afraid as if Something Awful Might Happen: Several days  RL-7 Total Score: 3  If you checked off any problems, how difficult have these problems made it for you  to do your work, take care of things at home, or get along with other people?: Somewhat difficult     Interventions Provided:   Psychoeducation/Patient education and Cognitive Behavioral Therapy (CBT)    Progress Made:   As Expected and Improvement    Response to Intervention:  We discussed the following elements during appointment:   Had the stomach flu; gets scared/fear of vomiting, nausea, pain (something awful might happen) - fear of ending up in hospital. Wasn't able to take lexapro and buspar during that time due to stomach problems    Work- some things are a lot better, but slower days at work with less responsibilities/responsibilities evened out among MA's.  New workflow with responsibilities shifting, promoted to MA2    MEDICATION:   Lexapro 20mg 1x/day  Buspar- has been taking the past few days; 5mg; 10mg makes her sweaty/jittery. Takes in the morning but keeps if needed; aware of it to be effective need to take consistently    Patient was engaged, responsive, and interactive.     Plan:   Follow up in person appointment 5/22/25  Reframe thoughts of work duties now that changes have been made to ease her schedule    Follow Up / Next Appointment: Next appointment: 05/22/25

## 2025-05-22 ENCOUNTER — APPOINTMENT (OUTPATIENT)
Dept: PRIMARY CARE | Facility: CLINIC | Age: 25
End: 2025-05-22
Payer: COMMERCIAL

## 2025-05-30 ENCOUNTER — DOCUMENTATION (OUTPATIENT)
Dept: PRIMARY CARE | Facility: CLINIC | Age: 25
End: 2025-05-30
Payer: COMMERCIAL

## 2025-05-30 DIAGNOSIS — F41.9 ANXIETY: Primary | ICD-10-CM

## 2025-06-02 ENCOUNTER — TELEPHONE (OUTPATIENT)
Dept: PRIMARY CARE | Facility: CLINIC | Age: 25
End: 2025-06-02
Payer: COMMERCIAL

## 2025-06-11 ENCOUNTER — APPOINTMENT (OUTPATIENT)
Dept: PRIMARY CARE | Facility: CLINIC | Age: 25
End: 2025-06-11
Payer: COMMERCIAL

## 2025-06-11 ASSESSMENT — PATIENT HEALTH QUESTIONNAIRE - PHQ9
1. LITTLE INTEREST OR PLEASURE IN DOING THINGS: NOT AT ALL
9. THOUGHTS THAT YOU WOULD BE BETTER OFF DEAD, OR OF HURTING YOURSELF: NOT AT ALL
SUM OF ALL RESPONSES TO PHQ9 QUESTIONS 1 & 2: 0
4. FEELING TIRED OR HAVING LITTLE ENERGY: MORE THAN HALF THE DAYS
SUM OF ALL RESPONSES TO PHQ QUESTIONS 1-9: 3
6. FEELING BAD ABOUT YOURSELF - OR THAT YOU ARE A FAILURE OR HAVE LET YOURSELF OR YOUR FAMILY DOWN: NOT AT ALL
8. MOVING OR SPEAKING SO SLOWLY THAT OTHER PEOPLE COULD HAVE NOTICED. OR THE OPPOSITE, BEING SO FIGETY OR RESTLESS THAT YOU HAVE BEEN MOVING AROUND A LOT MORE THAN USUAL: NOT AT ALL
5. POOR APPETITE OR OVEREATING: SEVERAL DAYS
3. TROUBLE FALLING OR STAYING ASLEEP: NOT AT ALL
2. FEELING DOWN, DEPRESSED OR HOPELESS: NOT AT ALL
7. TROUBLE CONCENTRATING ON THINGS, SUCH AS READING THE NEWSPAPER OR WATCHING TELEVISION: NOT AT ALL

## 2025-06-11 ASSESSMENT — ANXIETY QUESTIONNAIRES
1. FEELING NERVOUS, ANXIOUS, OR ON EDGE: SEVERAL DAYS
3. WORRYING TOO MUCH ABOUT DIFFERENT THINGS: NOT AT ALL
5. BEING SO RESTLESS THAT IT IS HARD TO SIT STILL: NOT AT ALL
IF YOU CHECKED OFF ANY PROBLEMS ON THIS QUESTIONNAIRE, HOW DIFFICULT HAVE THESE PROBLEMS MADE IT FOR YOU TO DO YOUR WORK, TAKE CARE OF THINGS AT HOME, OR GET ALONG WITH OTHER PEOPLE: SOMEWHAT DIFFICULT
GAD7 TOTAL SCORE: 2
2. NOT BEING ABLE TO STOP OR CONTROL WORRYING: NOT AT ALL
7. FEELING AFRAID AS IF SOMETHING AWFUL MIGHT HAPPEN: NOT AT ALL
4. TROUBLE RELAXING: NOT AT ALL
6. BECOMING EASILY ANNOYED OR IRRITABLE: SEVERAL DAYS

## 2025-06-11 NOTE — PROGRESS NOTES
Collaborative Care ([facility])  Progress Note    Type of Interaction: Virtual    Start Time: 12:02 PM    End Time: 12:41 PM    Appointment: Scheduled    Reason for Visit:   Chief Complaint   Patient presents with    Follow-up       Interval History / Patient Symptoms:   Samantha Sunshine is a 25 y.o. female currently enrolled in the [facility] program for symptom monitoring, brief therapy, and support. Patient presents today for follow up for Collaborative Care services.     Metrics:  Patient Health Questionnaire-9 Score: 3 (6/11/2025 12:18 PM)  RL-7 Total Score: 2 (6/11/2025 12:17 PM)       Over the past 2 weeks, how often have you been bothered by any of the following problems?  Little interest or pleasure in doing things: Not at all  Feeling down, depressed, or hopeless: Not at all  Trouble falling or staying asleep, or sleeping too much: Not at all  Feeling tired or having little energy: More than half the days  Poor appetite or overeating: Several days  Feeling bad about yourself - or that you are a failure or have let yourself or your family down: Not at all  Trouble concentrating on things, such as reading the newspaper or watching television: Not at all  Moving or speaking so slowly that other people could have noticed? Or the opposite - being so fidgety or restless that you have been moving around a lot more than usual.: Not at all  Thoughts that you would be better off dead or hurting yourself in some way: Not at all  Patient Health Questionnaire-9 Score: 3     RL-7  Feeling Nervous, Anxious, or on Edge: Several days  Not Being Able to Stop or Control Worrying: Not at all  Worrying too Much About Different Things: Not at all  Trouble Relaxing: Not at all  Being so Restless That it is Hard to Sit Still: Not at all  Becoming Easily Annoyed or Irritable: Several days  Feeling Afraid as if Something Awful Might Happen: Not at all  RL-7 Total Score: 2  If you checked off any problems, how difficult have these problems made  it for you to do your work, take care of things at home, or get along with other people?: Somewhat difficult     Interventions Provided:   Psychoeducation/Patient education and Cognitive Behavioral Therapy (CBT)    Progress Made:   As Expected     Response to Intervention:  We discussed the following elements during appointment:   Mindset shift- calmer in brain, talking to self, talking to Wilner- finding strategies that are working right now  RL and PHQ low this appointment and patient states nothing concerning has been happening.  Work stable right now, as is home life.  Pregnancy going well and passed her glucose test and starting to prepare mentally for delivery and post-partum.  Encouraged patient to look into  IOP program through  for support after delivery or ask OB for suggestions through [facility].      MEDICATION: stable on lexapro    Patient was engaged, responsive, and interactive.     Plan:   Follow up in 1 month; will determine need for continued CoCM services at that time.    Follow Up / Next Appointment: Next appointment: 07/10/25

## 2025-06-30 ENCOUNTER — DOCUMENTATION (OUTPATIENT)
Dept: PRIMARY CARE | Facility: CLINIC | Age: 25
End: 2025-06-30
Payer: COMMERCIAL

## 2025-06-30 DIAGNOSIS — F41.9 ANXIETY: Primary | ICD-10-CM

## 2025-06-30 PROCEDURE — 99493 SBSQ PSYC COLLAB CARE MGMT: CPT | Performed by: FAMILY MEDICINE

## 2025-07-10 ENCOUNTER — APPOINTMENT (OUTPATIENT)
Dept: PRIMARY CARE | Facility: CLINIC | Age: 25
End: 2025-07-10
Payer: COMMERCIAL

## 2025-07-17 ENCOUNTER — TELEPHONE (OUTPATIENT)
Dept: PRIMARY CARE | Facility: CLINIC | Age: 25
End: 2025-07-17
Payer: COMMERCIAL

## 2025-07-25 ENCOUNTER — TELEPHONE (OUTPATIENT)
Dept: PRIMARY CARE | Facility: CLINIC | Age: 25
End: 2025-07-25
Payer: COMMERCIAL

## 2025-08-29 ENCOUNTER — DOCUMENTATION (OUTPATIENT)
Dept: PRIMARY CARE | Facility: CLINIC | Age: 25
End: 2025-08-29
Payer: COMMERCIAL